# Patient Record
Sex: FEMALE | Race: BLACK OR AFRICAN AMERICAN | Employment: FULL TIME | URBAN - METROPOLITAN AREA
[De-identification: names, ages, dates, MRNs, and addresses within clinical notes are randomized per-mention and may not be internally consistent; named-entity substitution may affect disease eponyms.]

---

## 2019-03-06 ENCOUNTER — APPOINTMENT (OUTPATIENT)
Dept: CT IMAGING | Age: 39
End: 2019-03-06
Payer: MEDICAID

## 2019-03-06 ENCOUNTER — HOSPITAL ENCOUNTER (EMERGENCY)
Age: 39
Discharge: HOME | End: 2019-03-06
Payer: MEDICAID

## 2019-03-06 VITALS
HEIGHT: 61 IN | TEMPERATURE: 97.2 F | SYSTOLIC BLOOD PRESSURE: 141 MMHG | WEIGHT: 125 LBS | DIASTOLIC BLOOD PRESSURE: 86 MMHG | HEART RATE: 98 BPM | BODY MASS INDEX: 23.6 KG/M2 | OXYGEN SATURATION: 98 % | RESPIRATION RATE: 12 BRPM

## 2019-03-06 DIAGNOSIS — S00.03XA HEMATOMA OF SCALP, INITIAL ENCOUNTER: ICD-10-CM

## 2019-03-06 DIAGNOSIS — S09.90XA CLOSED HEAD INJURY, INITIAL ENCOUNTER: ICD-10-CM
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
## 2019-03-06 LAB
HCG, URINE, POC: NEGATIVE
Lab: NORMAL
NEGATIVE QC PASS/FAIL: NORMAL
POSITIVE QC PASS/FAIL: NORMAL

## 2019-03-06 PROCEDURE — 99283 EMERGENCY DEPT VISIT LOW MDM: CPT

## 2019-03-06 PROCEDURE — 70486 CT MAXILLOFACIAL W/O DYE: CPT

## 2019-03-06 PROCEDURE — 6370000000 HC RX 637 (ALT 250 FOR IP): Performed by: PHYSICIAN ASSISTANT

## 2019-03-06 PROCEDURE — 70450 CT HEAD/BRAIN W/O DYE: CPT

## 2019-03-06 PROCEDURE — 72125 CT NECK SPINE W/O DYE: CPT

## 2019-03-06 RX ORDER — ACETAMINOPHEN 500 MG
1000 TABLET ORAL ONCE
Status: COMPLETED | OUTPATIENT
Start: 2019-03-06 | End: 2019-03-06

## 2019-03-06 RX ORDER — NAPROXEN 500 MG/1
500 TABLET ORAL 2 TIMES DAILY
Qty: 14 TABLET | Refills: 0 | Status: SHIPPED | OUTPATIENT
Start: 2019-03-06 | End: 2021-07-02

## 2019-03-06 RX ADMIN — ACETAMINOPHEN 1000 MG: 500 TABLET ORAL at 17:08

## 2019-03-06 ASSESSMENT — PAIN SCALES - GENERAL: PAINLEVEL_OUTOF10: 9

## 2019-05-23 ENCOUNTER — HOSPITAL ENCOUNTER (EMERGENCY)
Age: 39
Discharge: HOME | End: 2019-05-23
Payer: MEDICAID

## 2019-05-23 ENCOUNTER — APPOINTMENT (OUTPATIENT)
Dept: CT IMAGING | Age: 39
End: 2019-05-23
Payer: MEDICAID

## 2019-05-23 VITALS
DIASTOLIC BLOOD PRESSURE: 77 MMHG | HEART RATE: 88 BPM | SYSTOLIC BLOOD PRESSURE: 120 MMHG | BODY MASS INDEX: 23.03 KG/M2 | TEMPERATURE: 97.7 F | RESPIRATION RATE: 14 BRPM | HEIGHT: 61 IN | OXYGEN SATURATION: 96 % | WEIGHT: 122 LBS
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
## 2019-05-23 DIAGNOSIS — S01.81XA FACIAL LACERATION, INITIAL ENCOUNTER: ICD-10-CM

## 2019-05-23 DIAGNOSIS — S09.90XA INJURY OF HEAD, INITIAL ENCOUNTER: ICD-10-CM

## 2019-05-23 DIAGNOSIS — S02.2XXA CLOSED FRACTURE OF NASAL BONE, INITIAL ENCOUNTER: ICD-10-CM

## 2019-05-23 PROCEDURE — 90471 IMMUNIZATION ADMIN: CPT | Performed by: NURSE PRACTITIONER

## 2019-05-23 PROCEDURE — 90715 TDAP VACCINE 7 YRS/> IM: CPT | Performed by: NURSE PRACTITIONER

## 2019-05-23 PROCEDURE — 6360000002 HC RX W HCPCS: Performed by: NURSE PRACTITIONER

## 2019-05-23 PROCEDURE — 99284 EMERGENCY DEPT VISIT MOD MDM: CPT

## 2019-05-23 PROCEDURE — 6370000000 HC RX 637 (ALT 250 FOR IP): Performed by: NURSE PRACTITIONER

## 2019-05-23 PROCEDURE — 70486 CT MAXILLOFACIAL W/O DYE: CPT

## 2019-05-23 PROCEDURE — 2500000003 HC RX 250 WO HCPCS: Performed by: NURSE PRACTITIONER

## 2019-05-23 PROCEDURE — 12013 RPR F/E/E/N/L/M 2.6-5.0 CM: CPT

## 2019-05-23 PROCEDURE — 70450 CT HEAD/BRAIN W/O DYE: CPT

## 2019-05-23 RX ORDER — CEPHALEXIN 500 MG/1
500 CAPSULE ORAL 4 TIMES DAILY
Qty: 40 CAPSULE | Refills: 0 | Status: SHIPPED | OUTPATIENT
Start: 2019-05-23 | End: 2021-07-02

## 2019-05-23 RX ORDER — OXYCODONE HYDROCHLORIDE AND ACETAMINOPHEN 5; 325 MG/1; MG/1
1 TABLET ORAL EVERY 6 HOURS PRN
Qty: 4 TABLET | Refills: 0 | Status: SHIPPED | OUTPATIENT
Start: 2019-05-23 | End: 2019-05-26

## 2019-05-23 RX ORDER — IBUPROFEN 800 MG/1
800 TABLET ORAL EVERY 8 HOURS PRN
Qty: 21 TABLET | Refills: 0 | Status: SHIPPED | OUTPATIENT
Start: 2019-05-23 | End: 2021-07-02

## 2019-05-23 RX ORDER — LIDOCAINE HYDROCHLORIDE 10 MG/ML
5 INJECTION, SOLUTION INFILTRATION; PERINEURAL ONCE
Status: COMPLETED | OUTPATIENT
Start: 2019-05-23 | End: 2019-05-23

## 2019-05-23 RX ORDER — OXYCODONE HYDROCHLORIDE AND ACETAMINOPHEN 5; 325 MG/1; MG/1
1 TABLET ORAL ONCE
Status: COMPLETED | OUTPATIENT
Start: 2019-05-23 | End: 2019-05-23

## 2019-05-23 RX ORDER — DIAPER,BRIEF,INFANT-TODD,DISP
EACH MISCELLANEOUS ONCE
Status: COMPLETED | OUTPATIENT
Start: 2019-05-23 | End: 2019-05-23

## 2019-05-23 RX ADMIN — TETANUS TOXOID, REDUCED DIPHTHERIA TOXOID AND ACELLULAR PERTUSSIS VACCINE, ADSORBED 0.5 ML: 5; 2.5; 8; 8; 2.5 SUSPENSION INTRAMUSCULAR at 19:59

## 2019-05-23 RX ADMIN — OXYCODONE HYDROCHLORIDE AND ACETAMINOPHEN 1 TABLET: 5; 325 TABLET ORAL at 19:59

## 2019-05-23 RX ADMIN — BACITRACIN ZINC: 500 OINTMENT TOPICAL at 20:04

## 2019-05-23 RX ADMIN — LIDOCAINE HYDROCHLORIDE 5 ML: 10 INJECTION, SOLUTION INFILTRATION; PERINEURAL at 20:04

## 2019-05-23 ASSESSMENT — PAIN DESCRIPTION - PAIN TYPE: TYPE: ACUTE PAIN

## 2019-05-23 ASSESSMENT — PAIN DESCRIPTION - ONSET: ONSET: ON-GOING

## 2019-05-23 ASSESSMENT — PAIN DESCRIPTION - ORIENTATION: ORIENTATION: RIGHT

## 2019-05-23 ASSESSMENT — PAIN DESCRIPTION - DESCRIPTORS: DESCRIPTORS: ACHING;THROBBING

## 2019-05-23 ASSESSMENT — PAIN SCALES - GENERAL
PAINLEVEL_OUTOF10: 10
PAINLEVEL_OUTOF10: 10

## 2019-05-23 ASSESSMENT — PAIN DESCRIPTION - FREQUENCY: FREQUENCY: CONTINUOUS

## 2019-05-23 ASSESSMENT — PAIN DESCRIPTION - LOCATION: LOCATION: FACE;HEAD

## 2019-05-23 ASSESSMENT — PAIN DESCRIPTION - PROGRESSION: CLINICAL_PROGRESSION: NOT CHANGED

## 2019-05-23 NOTE — ED NOTES
Pt is not in the room at this time. Unable to medicate pt.       Velma Draper, VENKAT  05/23/19 4161

## 2019-05-23 NOTE — ED NOTES
Radiology Procedure Waiver   Name: Lamonte Manley  : 1980  MRN: [id number]    Date:  19    Time: 7:25 PM    Benefits of immediately proceeding with Radiology exam(s) without pre-testing outweigh the risks or are not indicated as specified below and therefore the following is/are being waived:    [x] Pregnancy test   [x] Patients LMP on-time and regular.   [] Patient had Tubal Ligation or has other Contraception Device. [] Patient  is Menopausal or Premenarcheal.    [] Patient had Full or Partial Hysterectomy. [] Protocol for Iodine allergy    [] MRI Questionnaire     [] BUN/Creatinine   [] Patient age w/no hx of renal dysfunction. [] Patient on Dialysis. [] Recent Normal Labs.   Electronically signed by CANDIDO Costa CNP on 19 at 7:25 PM             CANDIDO Costa CNP  [id number]
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
## 2019-09-05 ENCOUNTER — HOSPITAL ENCOUNTER (OUTPATIENT)
Age: 39
Discharge: HOME | End: 2019-09-05
Payer: MEDICAID

## 2019-09-05 PROCEDURE — 86481 TB AG RESPONSE T-CELL SUSP: CPT

## 2019-09-05 PROCEDURE — 36415 COLL VENOUS BLD VENIPUNCTURE: CPT

## 2019-09-17 LAB
COMMENT: NORMAL
REPORT: NORMAL

## 2021-07-02 ENCOUNTER — HOSPITAL ENCOUNTER (EMERGENCY)
Age: 41
Discharge: HOME | End: 2021-07-03
Payer: MEDICAID

## 2021-07-02 ENCOUNTER — APPOINTMENT (OUTPATIENT)
Dept: ULTRASOUND IMAGING | Age: 41
End: 2021-07-02
Payer: MEDICAID

## 2021-07-02 DIAGNOSIS — N93.8 DYSFUNCTIONAL UTERINE BLEEDING: ICD-10-CM

## 2021-07-02 DIAGNOSIS — R11.0 NAUSEA: Primary | ICD-10-CM

## 2021-07-02 LAB
ABO/RH: NORMAL
ALBUMIN SERPL-MCNC: 4.8 G/DL (ref 3.5–5.2)
ALP BLD-CCNC: 81 U/L (ref 35–104)
ALT SERPL-CCNC: 7 U/L (ref 0–32)
ANION GAP SERPL CALCULATED.3IONS-SCNC: 10 MMOL/L (ref 7–16)
AST SERPL-CCNC: 13 U/L (ref 0–31)
BACTERIA: ABNORMAL /HPF
BASOPHILS ABSOLUTE: 0.02 E9/L (ref 0–0.2)
BASOPHILS RELATIVE PERCENT: 0.2 % (ref 0–2)
BILIRUB SERPL-MCNC: 1 MG/DL (ref 0–1.2)
BILIRUBIN URINE: NEGATIVE
BLOOD, URINE: ABNORMAL
BUN BLDV-MCNC: 7 MG/DL (ref 6–20)
CALCIUM SERPL-MCNC: 9.4 MG/DL (ref 8.6–10.2)
CHLORIDE BLD-SCNC: 101 MMOL/L (ref 98–107)
CLARITY: ABNORMAL
CO2: 30 MMOL/L (ref 22–29)
COLOR: ABNORMAL
CREAT SERPL-MCNC: 0.6 MG/DL (ref 0.5–1)
EOSINOPHILS ABSOLUTE: 0.07 E9/L (ref 0.05–0.5)
EOSINOPHILS RELATIVE PERCENT: 0.8 % (ref 0–6)
GFR AFRICAN AMERICAN: >60
GFR NON-AFRICAN AMERICAN: >60 ML/MIN/1.73
GLUCOSE BLD-MCNC: 112 MG/DL (ref 74–99)
GLUCOSE URINE: NEGATIVE MG/DL
GONADOTROPIN, CHORIONIC (HCG) QUANT: 6.6 MIU/ML
HCG QUALITATIVE: NEGATIVE
HCG, URINE, POC: NEGATIVE
HCT VFR BLD CALC: 36.1 % (ref 34–48)
HEMOGLOBIN: 12 G/DL (ref 11.5–15.5)
IMMATURE GRANULOCYTES #: 0.03 E9/L
IMMATURE GRANULOCYTES %: 0.3 % (ref 0–5)
KETONES, URINE: ABNORMAL MG/DL
LEUKOCYTE ESTERASE, URINE: ABNORMAL
LYMPHOCYTES ABSOLUTE: 3.07 E9/L (ref 1.5–4)
LYMPHOCYTES RELATIVE PERCENT: 35 % (ref 20–42)
Lab: NORMAL
MAGNESIUM: 2 MG/DL (ref 1.6–2.6)
MCH RBC QN AUTO: 27.1 PG (ref 26–35)
MCHC RBC AUTO-ENTMCNC: 33.2 % (ref 32–34.5)
MCV RBC AUTO: 81.7 FL (ref 80–99.9)
MONOCYTES ABSOLUTE: 0.64 E9/L (ref 0.1–0.95)
MONOCYTES RELATIVE PERCENT: 7.3 % (ref 2–12)
NEGATIVE QC PASS/FAIL: NORMAL
NEUTROPHILS ABSOLUTE: 4.94 E9/L (ref 1.8–7.3)
NEUTROPHILS RELATIVE PERCENT: 56.4 % (ref 43–80)
NITRITE, URINE: POSITIVE
PDW BLD-RTO: 14.6 FL (ref 11.5–15)
PH UA: 8.5 (ref 5–9)
PLATELET # BLD: 278 E9/L (ref 130–450)
PMV BLD AUTO: 9.6 FL (ref 7–12)
POSITIVE QC PASS/FAIL: NORMAL
POTASSIUM REFLEX MAGNESIUM: 3.3 MMOL/L (ref 3.5–5)
PROTEIN UA: 100 MG/DL
RBC # BLD: 4.42 E12/L (ref 3.5–5.5)
RBC UA: ABNORMAL /HPF (ref 0–2)
SODIUM BLD-SCNC: 141 MMOL/L (ref 132–146)
SPECIFIC GRAVITY UA: 1.01 (ref 1–1.03)
TOTAL PROTEIN: 8.3 G/DL (ref 6.4–8.3)
UROBILINOGEN, URINE: 4 E.U./DL
WBC # BLD: 8.8 E9/L (ref 4.5–11.5)
WBC UA: ABNORMAL /HPF (ref 0–5)

## 2021-07-02 PROCEDURE — 87591 N.GONORRHOEAE DNA AMP PROB: CPT

## 2021-07-02 PROCEDURE — 81001 URINALYSIS AUTO W/SCOPE: CPT

## 2021-07-02 PROCEDURE — 6370000000 HC RX 637 (ALT 250 FOR IP): Performed by: PHYSICIAN ASSISTANT

## 2021-07-02 PROCEDURE — 84703 CHORIONIC GONADOTROPIN ASSAY: CPT

## 2021-07-02 PROCEDURE — 84702 CHORIONIC GONADOTROPIN TEST: CPT

## 2021-07-02 PROCEDURE — 99283 EMERGENCY DEPT VISIT LOW MDM: CPT

## 2021-07-02 PROCEDURE — 86900 BLOOD TYPING SEROLOGIC ABO: CPT

## 2021-07-02 PROCEDURE — 87491 CHLMYD TRACH DNA AMP PROBE: CPT

## 2021-07-02 PROCEDURE — 80053 COMPREHEN METABOLIC PANEL: CPT

## 2021-07-02 PROCEDURE — 85025 COMPLETE CBC W/AUTO DIFF WBC: CPT

## 2021-07-02 PROCEDURE — 83735 ASSAY OF MAGNESIUM: CPT

## 2021-07-02 PROCEDURE — 86901 BLOOD TYPING SEROLOGIC RH(D): CPT

## 2021-07-02 PROCEDURE — 76801 OB US < 14 WKS SINGLE FETUS: CPT

## 2021-07-02 PROCEDURE — 36415 COLL VENOUS BLD VENIPUNCTURE: CPT

## 2021-07-02 PROCEDURE — 87210 SMEAR WET MOUNT SALINE/INK: CPT

## 2021-07-02 RX ORDER — POTASSIUM CHLORIDE 20 MEQ/1
40 TABLET, EXTENDED RELEASE ORAL ONCE
Status: COMPLETED | OUTPATIENT
Start: 2021-07-02 | End: 2021-07-02

## 2021-07-02 RX ORDER — ONDANSETRON 2 MG/ML
4 INJECTION INTRAMUSCULAR; INTRAVENOUS ONCE
Status: DISCONTINUED | OUTPATIENT
Start: 2021-07-02 | End: 2021-07-02

## 2021-07-02 RX ORDER — ONDANSETRON 4 MG/1
4 TABLET, ORALLY DISINTEGRATING ORAL EVERY 8 HOURS PRN
Qty: 10 TABLET | Refills: 0 | Status: SHIPPED | OUTPATIENT
Start: 2021-07-02 | End: 2021-07-02

## 2021-07-02 RX ADMIN — POTASSIUM CHLORIDE 40 MEQ: 20 TABLET, EXTENDED RELEASE ORAL at 21:59

## 2021-07-02 NOTE — ED NOTES
Bed: 01  Expected date:   Expected time:   Means of arrival:   Comments:  triage     Yuliya Feng, VENKAT  07/02/21 0665

## 2021-07-02 NOTE — ED NOTES
FIRST PROVIDER CONTACT ASSESSMENT NOTE        Department of Emergency Medicine            ED  First Provider Note            7/2/21  7:51 PM EDT    Chief Complaint: No chief complaint on file. History of Present Illness:    Kenyatta Vivas is a 42 y.o. female who presents to the emergency department for  nausea, no appetite positive pregnancy    Focused Screening Exam:  Constitutional:  Alert, appears stated age and is in no distress. Heart rrr   Lungs clear     *ALLERGIES*     Patient has no known allergies.      ED Triage Vitals [07/02/21 1950]   BP Temp Temp src Pulse Resp SpO2 Height Weight   -- 97.1 °F (36.2 °C) -- 88 -- 96 % -- --        Initial Plan of Care:  Initiate Treatment-Testing, Proceed toTreatment Area When Bed Available for ED Attending/MLP to Continue Care    -----------------[address] ASSESSMENT NOTE--------------  Electronically signed by APOLINAR Baires   DD: 7/2/21     APOLINAR Baires  07/02/21 1954

## 2021-07-02 NOTE — LETTER
Jack Sheffield was seen and treated in our emergency department on 7/2/2021. She may return to work on 7/5/2021. If you have any questions or concerns, please don't hesitate to call.     Jaylon Uribe RN

## 2021-07-03 VITALS
HEART RATE: 72 BPM | HEIGHT: 61 IN | DIASTOLIC BLOOD PRESSURE: 74 MMHG | WEIGHT: 130 LBS | TEMPERATURE: 97.1 F | RESPIRATION RATE: 12 BRPM | OXYGEN SATURATION: 100 % | BODY MASS INDEX: 24.55 KG/M2 | SYSTOLIC BLOOD PRESSURE: 125 MMHG

## 2021-07-03 LAB
CLUE CELLS: NORMAL
SOURCE WET PREP: NORMAL
TRICHOMONAS PREP: NORMAL
YEAST WET PREP: NORMAL

## 2021-07-03 NOTE — ED NOTES
HCG poct negative.       Israel Barakat RN  07/02/21 7942      After many minutes this rn walked into pt's room and pregnancy test is now positive     Israel Barakat RN  07/02/21 2770

## 2021-07-03 NOTE — ED PROVIDER NOTES
[facility]  HPI:  21, Time: 8:03 PM EDT         Ruba Sellers is a 42 y.o. female presenting to the ED for nausea, lack of appetite beginning 1 week  ago. The complaint has been persistent, moderate in severity, and worsened by nothing. Patient comes in with complaint of nausea no appetite over the last week. She states she generally started. Around the  did not start her menses, and took 6 urine pregnancies which were positive. She states this morning she woke up spotting. She denies any abdominal pain. Review of Systems:   A complete review of systems was performed and pertinent positives and negatives are stated within HPI, all other systems reviewed and are negative.          --------------------------------------------- PAST HISTORY ---------------------------------------------  Past Medical History:  has a past medical history of Abnormal Pap smear, Diabetes mellitus ([facility] [address] and Sickle cell trait ([facility] Past Surgical History:  has a past surgical history that includes laparoscopy and  section. Social History:  reports that she has never smoked. She has never used smokeless tobacco. She reports that she does not drink alcohol and does not use drugs. Family History: family history is not on file. The patients home medications have been reviewed. Allergies: Patient has no known allergies.     -------------------------------------------------- RESULTS -------------------------------------------------  All laboratory and radiology results have been personally reviewed by myself   LABS:  Results for orders placed or performed during the hospital encounter of 21   C.trachomatis N.gonorrhoeae DNA    Specimen: Cervix   Result Value Ref Range    Source vagina    Wet prep, genital   Result Value Ref Range    Trichomonas Prep None Seen     Yeast, Wet Prep None Seen     Clue Cells, Wet Prep None Seen     Source Wet Prep VAGINAL    CBC Auto Differential   Result Value Ref Range    WBC 8.8 4.5 - 11.5 E9/L    RBC 4.42 3.50 - 5.50 E12/L    Hemoglobin 12.0 11.5 - 15.5 g/dL    Hematocrit 36.1 34.0 - 48.0 %    MCV 81.7 80.0 - 99.9 fL    MCH 27.1 26.0 - 35.0 pg    MCHC 33.2 32.0 - 34.5 %    RDW 14.6 11.5 - 15.0 fL    Platelets 295 426 - 556 E9/L    MPV 9.6 7.0 - 12.0 fL    Neutrophils % 56.4 43.0 - 80.0 %    Immature Granulocytes % 0.3 0.0 - 5.0 %    Lymphocytes % 35.0 20.0 - 42.0 %    Monocytes % 7.3 2.0 - 12.0 %    Eosinophils % 0.8 0.0 - 6.0 %    Basophils % 0.2 0.0 - 2.0 %    Neutrophils Absolute 4.94 1.80 - 7.30 E9/L    Immature Granulocytes # 0.03 E9/L    Lymphocytes Absolute 3.07 1.50 - 4.00 E9/L    Monocytes Absolute 0.64 0.10 - 0.95 E9/L    Eosinophils Absolute 0.07 0.05 - 0.50 E9/L    Basophils Absolute 0.02 0.00 - 0.20 E9/L   Comprehensive Metabolic Panel w/ Reflex to MG   Result Value Ref Range    Sodium 141 132 - 146 mmol/L    Potassium reflex Magnesium 3.3 (L) 3.5 - 5.0 mmol/L    Chloride 101 98 - 107 mmol/L    CO2 30 (H) 22 - 29 mmol/L    Anion Gap 10 7 - 16 mmol/L    Glucose 112 (H) 74 - 99 mg/dL    BUN 7 6 - 20 mg/dL    CREATININE 0.6 0.5 - 1.0 mg/dL    GFR Non-African American >60 >=60 mL/min/1.73    GFR African American >60     Calcium 9.4 8.6 - 10.2 mg/dL    Total Protein 8.3 6.4 - 8.3 g/dL    Albumin 4.8 3.5 - 5.2 g/dL    Total Bilirubin 1.0 0.0 - 1.2 mg/dL    Alkaline Phosphatase 81 35 - 104 U/L    ALT 7 0 - 32 U/L    AST 13 0 - 31 U/L   HCG Qualitative, Serum   Result Value Ref Range    hCG Qual NEGATIVE NEGATIVE   HCG, Quantitative, Pregnancy   Result Value Ref Range    hCG Quant 6.6 <10 mIU/mL   Urinalysis   Result Value Ref Range    Color, UA RED (A) Straw/Yellow    Clarity, UA TURBID (A) Clear    Glucose, Ur Negative Negative mg/dL    Bilirubin Urine Negative Negative    Ketones, Urine TRACE (A) Negative mg/dL    Specific Gravity, UA 1.015 1.005 - 1.030    Blood, Urine LARGE (A) Negative    pH, UA 8.5 5.0 - 9.0    Protein,  (A) Negative mg/dL Urobilinogen, Urine 4.0 (A) <2.0 E.U./dL    Nitrite, Urine POSITIVE (A) Negative    Leukocyte Esterase, Urine SMALL (A) Negative   Magnesium   Result Value Ref Range    Magnesium 2.0 1.6 - 2.6 mg/dL   Microscopic Urinalysis   Result Value Ref Range    WBC, UA 1-3 0 - 5 /HPF    RBC, UA PACKED 0 - 2 /HPF    Bacteria, UA MODERATE (A) None Seen /HPF   POC Pregnancy Urine Qual   Result Value Ref Range    HCG, Urine, POC Negative Negative    Lot Number [id number]     Positive QC Pass/Fail Acceptable     Negative QC Pass/Fail Acceptable    ABO/RH   Result Value Ref Range    ABO/Rh O POS        RADIOLOGY:  Interpreted by Radiologist.  US OB LESS THAN 14 WEEKS SINGLE OR FIRST GESTATION   Final Result   No evidence of an intrauterine pregnancy. No fluid or debris in the   endometrial canal.      Sonographically normal right and left ovary and right left adnexa. No free fluid. Correlate with beta HCG levels. ------------------------- NURSING NOTES AND VITALS REVIEWED ---------------------------   The nursing notes within the ED encounter and vital signs as below have been reviewed. /74   Pulse 72   Temp 97.1 °F (36.2 °C)   Resp 12   Ht 5' 1\" (1.549 m)   Wt 130 lb (59 kg)   SpO2 100%   BMI 24.56 kg/m²   Oxygen Saturation Interpretation: Normal      ---------------------------------------------------PHYSICAL EXAM--------------------------------------      Constitutional/General: Alert and oriented x3, well appearing, non toxic in NAD  Head: Normocephalic and atraumatic  Eyes: PERRL, EOMI  Mouth: Oropharynx clear, handling secretions, no trismus  Neck: Supple, full ROM,   Pulmonary: Lungs clear to auscultation bilaterally, no wheezes, rales, or rhonchi. Not in respiratory distress  Cardiovascular:  Regular rate and rhythm, no murmurs, gallops, or rubs. 2+ distal pulses  Abdomen: Soft, non tender, non distended  pelvic normal external genitalia.   Small to moderate amount of blood within the vaginal vault no cervical motion tenderness no adnexal tenderness no uterine tenderness on exam  Extremities: Moves all extremities x 4. Warm and well perfused  Skin: warm and dry without rash  Neurologic: GCS 15,  Psych: Normal Affect      ------------------------------ ED COURSE/MEDICAL DECISION MAKING----------------------  Medications   potassium chloride (KLOR-CON M) extended release tablet 40 mEq (40 mEq Oral Given 7/2/21 2159)         ED COURSE:   2115 patient updated. Medical Decision Making:    Patient came in with complaint of multiple positive pregnancy test at home. Urine hCG serum hCG were negative and hCG quant was 6 less than 10 being negative. Ultrasound showing no evidence of intrauterine pregnancy no fluid or debris in the endometrial canal no free fluid. Patient likely on her menses at this time. She does have an appointment with her OB/GYN she is to call on Monday for follow-up. Patient with normal hemoglobin hemodynamically stable. Counseling: The emergency provider has spoken with the patient and discussed todays results, in addition to providing specific details for the plan of care and counseling regarding the diagnosis and prognosis. Questions are answered at this time and they are agreeable with the plan.      --------------------------------- IMPRESSION AND DISPOSITION ---------------------------------    IMPRESSION  1. Nausea    2. Dysfunctional uterine bleeding        DISPOSITION  Disposition: Discharge to home  Patient condition is good      NOTE: This report was transcribed using voice recognition software.  Every effort was made to ensure accuracy; however, inadvertent computerized transcription errors may be present     APOLINAR Reina  07/03/21 1222 APOLINAR Harper  07/03/21 0878

## 2021-07-08 LAB
C TRACH DNA GENITAL QL NAA+PROBE: NEGATIVE
N. GONORRHOEAE DNA: NEGATIVE
SOURCE: NORMAL

## 2021-08-11 ENCOUNTER — OFFICE VISIT (OUTPATIENT)
Dept: OBGYN | Age: 41
End: 2021-08-11
Payer: MEDICAID

## 2021-08-11 VITALS
SYSTOLIC BLOOD PRESSURE: 126 MMHG | HEIGHT: 61 IN | DIASTOLIC BLOOD PRESSURE: 72 MMHG | HEART RATE: 88 BPM | WEIGHT: 109 LBS | RESPIRATION RATE: 16 BRPM | BODY MASS INDEX: 20.58 KG/M2

## 2021-08-11 DIAGNOSIS — Z01.419 WELL WOMAN EXAM WITH ROUTINE GYNECOLOGICAL EXAM: Primary | ICD-10-CM

## 2021-08-11 DIAGNOSIS — Z12.31 ENCOUNTER FOR SCREENING MAMMOGRAM FOR BREAST CANCER: ICD-10-CM

## 2021-08-11 PROCEDURE — 99203 OFFICE O/P NEW LOW 30 MIN: CPT | Performed by: OBSTETRICS & GYNECOLOGY

## 2021-08-11 PROCEDURE — 99386 PREV VISIT NEW AGE 40-64: CPT | Performed by: OBSTETRICS & GYNECOLOGY

## 2021-08-11 NOTE — PROGRESS NOTES
Chief complaint: 42 year- old presents for well woman exam. This is a new patient to me and to the [facility]. History:    G4, P4,Ab0., 2 vaginal, 2   Doing well. Periods:  Regular, normal.  Sexually active: yes . No abdominal or pelvic pain. No dyspareunia. No abnormal vaginal  discharge. Previous Pap smears normal. Last Pap smear last yeat at [facility] [place]. No urinary or GI symptoms. Medical/ surgical history and medications reviewed. Mammogram discussed and ordered. On metformin for pre diabetes. [place] [facility]    ROS: Negative    Examination:    Vital signs reviewed. GA : Healthy. Oriented x 3 no distress. HEENT : hearing grossly intact, no jaundice, no oral lesions or nasal discharge. Neck : Supple. Thyroid : normal, no goiter. Breasts : no masses. No skin changes or lymphadenopathy. No nipple discharge. Abdomen :Soft. No masses. No organomegaly. V&V : Normal female external genitalia. BUS: Normal.  PS : Adequate. Cervix : No lesions, pap smear not due, will send for old paps. Uterus : Normal size. Adnexa : Free, no masses.     IMP: Normal    Plan: RTC 1-2 years, Mammogram, send for pap reports

## 2022-08-30 ENCOUNTER — TELEPHONE (OUTPATIENT)
Dept: OBGYN | Age: 42
End: 2022-08-30

## 2022-08-31 DIAGNOSIS — Z12.31 ENCOUNTER FOR SCREENING MAMMOGRAM FOR BREAST CANCER: Primary | ICD-10-CM

## 2022-10-20 ENCOUNTER — OFFICE VISIT (OUTPATIENT)
Dept: FAMILY MEDICINE CLINIC | Age: 42
End: 2022-10-20
Payer: MEDICAID

## 2022-10-20 VITALS
WEIGHT: 113.6 LBS | BODY MASS INDEX: 21.45 KG/M2 | OXYGEN SATURATION: 100 % | HEART RATE: 98 BPM | RESPIRATION RATE: 12 BRPM | TEMPERATURE: 97.5 F | DIASTOLIC BLOOD PRESSURE: 70 MMHG | HEIGHT: 61 IN | SYSTOLIC BLOOD PRESSURE: 124 MMHG

## 2022-10-20 DIAGNOSIS — E11.9 TYPE 2 DIABETES MELLITUS WITHOUT COMPLICATION, WITHOUT LONG-TERM CURRENT USE OF INSULIN (HCC): Primary | ICD-10-CM

## 2022-10-20 DIAGNOSIS — R92.8 ABNORMAL MAMMOGRAM OF LEFT BREAST: ICD-10-CM

## 2022-10-20 PROCEDURE — 1036F TOBACCO NON-USER: CPT | Performed by: FAMILY MEDICINE

## 2022-10-20 PROCEDURE — 99203 OFFICE O/P NEW LOW 30 MIN: CPT | Performed by: FAMILY MEDICINE

## 2022-10-20 PROCEDURE — 3046F HEMOGLOBIN A1C LEVEL >9.0%: CPT | Performed by: FAMILY MEDICINE

## 2022-10-20 PROCEDURE — G8427 DOCREV CUR MEDS BY ELIG CLIN: HCPCS | Performed by: FAMILY MEDICINE

## 2022-10-20 PROCEDURE — G8484 FLU IMMUNIZE NO ADMIN: HCPCS | Performed by: FAMILY MEDICINE

## 2022-10-20 PROCEDURE — G8420 CALC BMI NORM PARAMETERS: HCPCS | Performed by: FAMILY MEDICINE

## 2022-10-20 PROCEDURE — 2022F DILAT RTA XM EVC RTNOPTHY: CPT | Performed by: FAMILY MEDICINE

## 2022-10-20 SDOH — ECONOMIC STABILITY: FOOD INSECURITY: WITHIN THE PAST 12 MONTHS, YOU WORRIED THAT YOUR FOOD WOULD RUN OUT BEFORE YOU GOT MONEY TO BUY MORE.: NEVER TRUE

## 2022-10-20 SDOH — ECONOMIC STABILITY: FOOD INSECURITY: WITHIN THE PAST 12 MONTHS, THE FOOD YOU BOUGHT JUST DIDN'T LAST AND YOU DIDN'T HAVE MONEY TO GET MORE.: NEVER TRUE

## 2022-10-20 ASSESSMENT — PATIENT HEALTH QUESTIONNAIRE - PHQ9
SUM OF ALL RESPONSES TO PHQ QUESTIONS 1-9: 24
2. FEELING DOWN, DEPRESSED OR HOPELESS: 3
SUM OF ALL RESPONSES TO PHQ QUESTIONS 1-9: 24
9. THOUGHTS THAT YOU WOULD BE BETTER OFF DEAD, OR OF HURTING YOURSELF: 0
6. FEELING BAD ABOUT YOURSELF - OR THAT YOU ARE A FAILURE OR HAVE LET YOURSELF OR YOUR FAMILY DOWN: 3
5. POOR APPETITE OR OVEREATING: 3
SUM OF ALL RESPONSES TO PHQ9 QUESTIONS 1 & 2: 6
SUM OF ALL RESPONSES TO PHQ QUESTIONS 1-9: 24
10. IF YOU CHECKED OFF ANY PROBLEMS, HOW DIFFICULT HAVE THESE PROBLEMS MADE IT FOR YOU TO DO YOUR WORK, TAKE CARE OF THINGS AT HOME, OR GET ALONG WITH OTHER PEOPLE: 3
4. FEELING TIRED OR HAVING LITTLE ENERGY: 3
7. TROUBLE CONCENTRATING ON THINGS, SUCH AS READING THE NEWSPAPER OR WATCHING TELEVISION: 3
1. LITTLE INTEREST OR PLEASURE IN DOING THINGS: 3
3. TROUBLE FALLING OR STAYING ASLEEP: 3
SUM OF ALL RESPONSES TO PHQ QUESTIONS 1-9: 24
8. MOVING OR SPEAKING SO SLOWLY THAT OTHER PEOPLE COULD HAVE NOTICED. OR THE OPPOSITE, BEING SO FIGETY OR RESTLESS THAT YOU HAVE BEEN MOVING AROUND A LOT MORE THAN USUAL: 3

## 2022-10-20 ASSESSMENT — COLUMBIA-SUICIDE SEVERITY RATING SCALE - C-SSRS
6. HAVE YOU EVER DONE ANYTHING, STARTED TO DO ANYTHING, OR PREPARED TO DO ANYTHING TO END YOUR LIFE?: NO
2. HAVE YOU ACTUALLY HAD ANY THOUGHTS OF KILLING YOURSELF?: NO
1. WITHIN THE PAST MONTH, HAVE YOU WISHED YOU WERE DEAD OR WISHED YOU COULD GO TO SLEEP AND NOT WAKE UP?: NO

## 2022-10-20 ASSESSMENT — ENCOUNTER SYMPTOMS
ABDOMINAL PAIN: 0
SHORTNESS OF BREATH: 0
DIARRHEA: 0
WHEEZING: 0
NAUSEA: 0
VOMITING: 0
CONSTIPATION: 0
COUGH: 0

## 2022-10-20 ASSESSMENT — SOCIAL DETERMINANTS OF HEALTH (SDOH): HOW HARD IS IT FOR YOU TO PAY FOR THE VERY BASICS LIKE FOOD, HOUSING, MEDICAL CARE, AND HEATING?: SOMEWHAT HARD

## 2022-10-20 NOTE — PROGRESS NOTES
[facility]  DATE OF VISIT : 10/20/2022    Patient : Valencia Metz   Age : 36 y.o.  : 1980   MRN : [id number]   ______________________________________________________________________    Chief Complaint :   Chief Complaint   Patient presents with    New Patient     Patient is here today to become established as a new patient. States she needs a breast biopsy of her left breast since she was told her breast was dense breasts, had breast augmentation surgery to be put on hold. C/o not being able to gain weight due to a mental breakdown due to the loss of her sister and two close friends. Went to Counseling and they said she was bipolar but does not want to be on meds. HPI : Valencia Metz is 36 y.o. female who presented to the clinic today for patient encounter. Patient spoke to a surgeon in August to get breast augmentation in [place], she was told she needed blood work and mammography. Patient had mammogram done in [facility] on 22: Received a letter stating that her mammogram showed the need for a left stereotactic core biopsy but has no explanation why. Patient denies any changes on the skin of her breast, no nipple inversion, bleeding or discharge. Patient has not noticed any lumps on her breast or axilla. Diabetes: Last A1C  6.8. Was on metformin 1000mg BID and Glipizide 2mg daily. I reviewed the patient's past medications, allergies and past medical history during this visit.     Past Medical History :  Health Maintenance-  Ob/Gyn:  [id number]  LMP/Menopause- 10/8, regular every 28days  Last PAP smear- - Negative PAP and HPV  Ob Hx-  , 2 C-sections  Mammogram- 22- abnormal      Past Medical History:   Diagnosis Date    Abnormal Pap smear     repeat was normal    Diabetes mellitus ([facility] [address] 2014    Sickle cell trait ([facility] [address]      Past Surgical History:   Procedure Laterality Date     SECTION LAPAROSCOPY         Social History :  Social History       Tobacco History       Smoking Status  Never      Smokeless Tobacco Use  Never              Alcohol History       Alcohol Use Status  No Comment  not during pregnancy              Drug Use       Drug Use Status  No              Sexual Activity       Sexually Active  Not Asked                     Allergies :   No Known Allergies    Medication List :    Current Outpatient Medications   Medication Sig Dispense Refill    blood glucose monitor kit and supplies Dispense sufficient amount for indicated testing frequency plus additional to accommodate PRN testing needs. Dispense all needed supplies to include: monitor, strips, lancing device, lancets, control solutions, alcohol swabs. 1 kit 0    metFORMIN (GLUCOPHAGE) 1000 MG tablet Take 1 tablet by mouth 2 times daily (with meals) 60 tablet 3    glimepiride (AMARYL) 2 MG tablet Take 1 tablet by mouth Daily with supper. (Patient not taking: No sig reported) 30 tablet 3    lanolin ointment Apply to nipples as needed (Patient not taking: No sig reported) 1 Tube 3     No current facility-administered medications for this visit. Review of Systems :  Review of Systems   Constitutional:  Negative for chills, fatigue and fever. Respiratory:  Negative for cough, shortness of breath and wheezing. Cardiovascular:  Negative for chest pain, palpitations and leg swelling. Gastrointestinal:  Negative for abdominal pain, constipation, diarrhea, nausea and vomiting. Endocrine: Negative for polydipsia and polyuria.    Neurological:  Negative for dizziness, weakness, light-headedness, numbness and headaches.   ______________________________________________________________________    Physical Exam :    Vitals: /70 (Site: Left Upper Arm, Position: Sitting, Cuff Size: Large Adult)   Pulse 98   Temp 97.5 °F (36.4 °C) (Temporal)   Resp 12   Ht 5' 1\" (1.549 m)   Wt 113 lb 9.6 oz (51.5 kg)   LMP 10/03/2022 (Approximate)   SpO2 100%   BMI 21.46 kg/m²   Physical Exam  Vitals reviewed. Constitutional:       General: She is not in acute distress. Appearance: Normal appearance. She is not ill-appearing. Cardiovascular:      Rate and Rhythm: Normal rate and regular rhythm. Pulses: Normal pulses. Heart sounds: Normal heart sounds. No murmur heard. Pulmonary:      Effort: Pulmonary effort is normal. No respiratory distress. Breath sounds: Normal breath sounds. No wheezing. Abdominal:      General: Bowel sounds are normal. There is no distension. Palpations: Abdomen is soft. Tenderness: There is no abdominal tenderness. Musculoskeletal:      Right lower leg: No edema. Left lower leg: No edema. Neurological:      Mental Status: She is alert.   ___________________    Assessment & Plan :    1. Type 2 diabetes mellitus without complication, without long-term current use of insulin ([facility])  -Last A1c 8/11 was 6.8  -Continue metformin and glimepiride  -We will repeat A1c  - blood glucose monitor kit and supplies; Dispense sufficient amount for indicated testing frequency plus additional to accommodate PRN testing needs. Dispense all needed supplies to include: monitor, strips, lancing device, lancets, control solutions, alcohol swabs. Dispense: 1 kit; Refill: 0  - metFORMIN (GLUCOPHAGE) 1000 MG tablet; Take 1 tablet by mouth 2 times daily (with meals)  Dispense: 60 tablet; Refill: 3    2. Abnormal mammogram of left breast  -Discussed with Jhony Gasca CNP at the [facility] breast center.  -Referral placed  -Patient will go today to drop off her disc with the mammogram imaging  -Breast center will review imaging and determine whether patient needs a repeat mammogram or they can go ahead and schedule for biopsy.   - Samantha Barnes MD, Breast Surgery, [facility])    Educational materials and/or home exercises printed for patient's review and were included in patient instructions on his/her After Visit Summary and given to patient at the end of visit. Counseled regarding above diagnosis, including possible risks and complications,  especially if left uncontrolled. Counseled regarding the possible side effects, risks, benefits and alternatives to treatment; patient and/or guardian verbalizes understanding, agrees, feels comfortable with and wishes to proceed with above treatment plan. Advised patient to call with any new medication issues, and read all Rx info from pharmacy to assure aware of all possible risks and side effects of medication before taking. Reviewed age and gender appropriate health screening exams and vaccinations. Advised patient regarding importance of keeping up with recommended health maintenance and to schedule as soon as possible if overdue, as this is important in assessing for undiagnosed pathology, especially cancer, as well as protecting against potentially harmful/life threatening disease. Patient and/or guardian verbalizes understanding and agrees with above counseling, assessment and plan. All questions answered    Additional plan and future considerations:   RTO in 2 months for diabetic follow-up. Return to Office: Return in about 2 months (around 12/20/2022) for Diabetes.     Electronically signed by Jamie Brown MD on 10/20/2022 at 3:51 PM

## 2022-10-21 ENCOUNTER — TELEPHONE (OUTPATIENT)
Dept: BREAST CENTER | Age: 42
End: 2022-10-21

## 2022-10-21 DIAGNOSIS — R92.0 BREAST MICROCALCIFICATIONS: ICD-10-CM

## 2022-10-21 DIAGNOSIS — N64.59 ABNORMAL BREAST FINDING: Primary | ICD-10-CM

## 2022-10-21 NOTE — TELEPHONE ENCOUNTER
Imaging report received from [facility]. Spoke with patient in reference to her referral to the breast clinic. She agrees to proceed with the recommended left breast stereotactic biopsy. She does not take any blood thinners. She has not had any prior imaging. She is aware to bring a disc with her. We will take order to the schedulers to call the patient. We will then call the patient after results are received to schedule accordingly.

## 2022-10-21 NOTE — TELEPHONE ENCOUNTER
Left detailed message with [facility] radiology requesting patient's most recent mammogram(s), ultrasound(s), diagnostic tests relating to her breast.  I also requested the last 3 years of mammogram reports as well. Fax and phone number provided. Per the referral, patient has been recommended a biopsy so once we get the reports we will review, call the patient, and schedule accordingly.

## 2022-11-09 ENCOUNTER — HOSPITAL ENCOUNTER (OUTPATIENT)
Dept: GENERAL RADIOLOGY | Age: 42
End: 2022-11-09
Payer: MEDICAID

## 2022-11-09 ENCOUNTER — HOSPITAL ENCOUNTER (OUTPATIENT)
Dept: GENERAL RADIOLOGY | Age: 42
Discharge: HOME | End: 2022-11-11
Payer: MEDICAID

## 2022-11-09 VITALS — BODY MASS INDEX: 21.46 KG/M2 | HEIGHT: 61 IN

## 2022-11-09 DIAGNOSIS — N64.59 ABNORMAL BREAST FINDING: ICD-10-CM

## 2022-11-09 DIAGNOSIS — R92.0 BREAST MICROCALCIFICATIONS: ICD-10-CM

## 2022-11-09 PROCEDURE — 88360 TUMOR IMMUNOHISTOCHEM/MANUAL: CPT

## 2022-11-09 PROCEDURE — 88342 IMHCHEM/IMCYTCHM 1ST ANTB: CPT

## 2022-11-09 PROCEDURE — 88305 TISSUE EXAM BY PATHOLOGIST: CPT

## 2022-11-09 PROCEDURE — 88341 IMHCHEM/IMCYTCHM EA ADD ANTB: CPT

## 2022-11-09 PROCEDURE — A4648 IMPLANTABLE TISSUE MARKER: HCPCS

## 2022-11-09 PROCEDURE — 2500000003 HC RX 250 WO HCPCS

## 2022-11-09 NOTE — PROGRESS NOTES
Met with patient prior to her breast biopsy. Instructed on stereotactic  breast biopsy procedure. Denies use of blood thinners or aspirin products within the past 5 days. Instructed that results will be available in approximately 3-5 business days. She  is agreeable to discussion of breast biopsy results by phone. Instructed that her physician will also be notified of results. Provided with folder containing my contact information and post biopsy discharge instructions. Instructed to call me if she has any questions or concerns about her biopsy. Verbalizes understanding.   Electronically signed by Yu Blank RN, BSN on 11/9/2022 at 9:11 AM

## 2022-11-18 DIAGNOSIS — C50.912 MALIGNANT NEOPLASM OF LEFT FEMALE BREAST, UNSPECIFIED ESTROGEN RECEPTOR STATUS, UNSPECIFIED SITE OF BREAST (HCC): Primary | ICD-10-CM

## 2022-11-23 ENCOUNTER — TELEPHONE (OUTPATIENT)
Dept: CASE MANAGEMENT | Age: 42
End: 2022-11-23

## 2022-11-23 ENCOUNTER — OFFICE VISIT (OUTPATIENT)
Dept: BREAST CENTER | Age: 42
End: 2022-11-23
Payer: MEDICAID

## 2022-11-23 ENCOUNTER — HOSPITAL ENCOUNTER (OUTPATIENT)
Dept: GENERAL RADIOLOGY | Age: 42
Discharge: HOME | End: 2022-11-25
Payer: MEDICAID

## 2022-11-23 VITALS
RESPIRATION RATE: 16 BRPM | WEIGHT: 112 LBS | DIASTOLIC BLOOD PRESSURE: 60 MMHG | BODY MASS INDEX: 21.14 KG/M2 | HEIGHT: 61 IN | HEART RATE: 90 BPM | TEMPERATURE: 98.6 F | OXYGEN SATURATION: 100 % | SYSTOLIC BLOOD PRESSURE: 106 MMHG

## 2022-11-23 DIAGNOSIS — C50.912 MALIGNANT NEOPLASM OF LEFT FEMALE BREAST, UNSPECIFIED ESTROGEN RECEPTOR STATUS, UNSPECIFIED SITE OF BREAST (HCC): Primary | ICD-10-CM

## 2022-11-23 DIAGNOSIS — C50.912 MALIGNANT NEOPLASM OF LEFT FEMALE BREAST, UNSPECIFIED ESTROGEN RECEPTOR STATUS, UNSPECIFIED SITE OF BREAST (HCC): ICD-10-CM

## 2022-11-23 LAB
ALBUMIN SERPL-MCNC: 4.6 G/DL (ref 3.5–5.2)
ALP BLD-CCNC: 65 U/L (ref 35–104)
ALT SERPL-CCNC: 6 U/L (ref 0–32)
ANION GAP SERPL CALCULATED.3IONS-SCNC: 13 MMOL/L (ref 7–16)
AST SERPL-CCNC: 11 U/L (ref 0–31)
BASOPHILS ABSOLUTE: 0.02 E9/L (ref 0–0.2)
BASOPHILS RELATIVE PERCENT: 0.2 % (ref 0–2)
BILIRUB SERPL-MCNC: 1.3 MG/DL (ref 0–1.2)
BUN BLDV-MCNC: 8 MG/DL (ref 6–20)
CALCIUM SERPL-MCNC: 9.6 MG/DL (ref 8.6–10.2)
CHLORIDE BLD-SCNC: 104 MMOL/L (ref 98–107)
CO2: 24 MMOL/L (ref 22–29)
CREAT SERPL-MCNC: 0.6 MG/DL (ref 0.5–1)
EOSINOPHILS ABSOLUTE: 0.03 E9/L (ref 0.05–0.5)
EOSINOPHILS RELATIVE PERCENT: 0.3 % (ref 0–6)
GFR SERPL CREATININE-BSD FRML MDRD: >60 ML/MIN/1.73
GLUCOSE BLD-MCNC: 102 MG/DL (ref 74–99)
HCT VFR BLD CALC: 32 % (ref 34–48)
HEMOGLOBIN: 10.6 G/DL (ref 11.5–15.5)
IMMATURE GRANULOCYTES #: 0.03 E9/L
IMMATURE GRANULOCYTES %: 0.3 % (ref 0–5)
LYMPHOCYTES ABSOLUTE: 2.81 E9/L (ref 1.5–4)
LYMPHOCYTES RELATIVE PERCENT: 32.4 % (ref 20–42)
MCH RBC QN AUTO: 26.8 PG (ref 26–35)
MCHC RBC AUTO-ENTMCNC: 33.1 % (ref 32–34.5)
MCV RBC AUTO: 80.8 FL (ref 80–99.9)
MONOCYTES ABSOLUTE: 0.77 E9/L (ref 0.1–0.95)
MONOCYTES RELATIVE PERCENT: 8.9 % (ref 2–12)
NEUTROPHILS ABSOLUTE: 5 E9/L (ref 1.8–7.3)
NEUTROPHILS RELATIVE PERCENT: 57.9 % (ref 43–80)
PDW BLD-RTO: 16.9 FL (ref 11.5–15)
PLATELET # BLD: 330 E9/L (ref 130–450)
PMV BLD AUTO: 9.5 FL (ref 7–12)
POTASSIUM SERPL-SCNC: 3.6 MMOL/L (ref 3.5–5)
RBC # BLD: 3.96 E12/L (ref 3.5–5.5)
SODIUM BLD-SCNC: 141 MMOL/L (ref 132–146)
TOTAL PROTEIN: 8.3 G/DL (ref 6.4–8.3)
WBC # BLD: 8.7 E9/L (ref 4.5–11.5)

## 2022-11-23 PROCEDURE — 99203 OFFICE O/P NEW LOW 30 MIN: CPT | Performed by: SURGERY

## 2022-11-23 PROCEDURE — 71046 X-RAY EXAM CHEST 2 VIEWS: CPT

## 2022-11-23 PROCEDURE — 1036F TOBACCO NON-USER: CPT | Performed by: SURGERY

## 2022-11-23 PROCEDURE — G8420 CALC BMI NORM PARAMETERS: HCPCS | Performed by: SURGERY

## 2022-11-23 PROCEDURE — G8484 FLU IMMUNIZE NO ADMIN: HCPCS | Performed by: SURGERY

## 2022-11-23 PROCEDURE — G8427 DOCREV CUR MEDS BY ELIG CLIN: HCPCS | Performed by: SURGERY

## 2022-11-23 NOTE — PATIENT INSTRUCTIONS
Genetic testing and lab work completed today. Breast MRI will be authorized then you will hear from schedulers.

## 2022-11-23 NOTE — PROGRESS NOTES
Date of Visit: 2022  New Patient Invasive Breast Cancer    22      DIAGNOSIS:  1. (22) LEFT (2:00) invasive ductal carcinoma  Clinical stage: T1a(2mm)N0M0  ER (95) HI (95) HER2 (0) Ki67 (5)  2. Sickle cell trait    IMAGING/PROCEDURES:  1. (22) BILATERAL d-mammogram ([place]): BIRADS-4  * LEFT breast calcifications  * Preop for augmentation  2. (22) LEFT stereotactic biopsy  *dk 1.7 x 1.7cm    PREVISIT PLAN:  * Review for extent of calcs with BS  * MRI      HISTORY OF PRESENT ILLNESS  Bea Alexander was in the office today for her consultation regarding a diagnosis of left breast cancer. Krystle underwent imaging studies in preparation for breast augmentation. There were calcifications noted in the left breast.  Stereotactic biopsy demonstrated the presence of 2 mm of invasive cancer and associated DCIS. The patient is in the office now to discuss the above and receive any additional recommendations. BREAST SYMPTOMS  Krystle has no symptoms to report. Specifically, she has no palpable masses. She has had no nipple discharge or retraction. She has no skin retraction or discoloration. PAST BREAST HISTORY  Meli Diego has no prior history of breast biopsies or breast surgeries. BREAST CANCER RISK FACTORS  Family history:  There is no family history of breast or ovary cancer    PAST MEDICAL/SURGICAL HISTORY  Past Medical History:   Diagnosis Date    Abnormal Pap smear     repeat was normal    Diabetes mellitus ([facility] [address] 2014    Sickle cell trait ([facility] [address]      Past Surgical History:   Procedure Laterality Date    BREAST BIOPSY       SECTION      LAPAROSCOPY      [facility] STEROTACTIC LOC BREAST BIOPSY LEFT Left 2022    DONNELL STEROTACTIC LOC BREAST BIOPSY LEFT 2022 [facility]       MEDICATIONS    Current Outpatient Medications:     metFORMIN (GLUCOPHAGE) 1000 MG tablet, Take 1 tablet by mouth 2 times daily (with meals), Disp: 60 tablet, Rfl: 3    blood glucose monitor kit and supplies, Dispense sufficient amount for indicated testing frequency plus additional to accommodate PRN testing needs. Dispense all needed supplies to include: monitor, strips, lancing device, lancets, control solutions, alcohol swabs. (Patient not taking: Reported on 11/23/2022), Disp: 1 kit, Rfl: 0    glimepiride (AMARYL) 2 MG tablet, Take 1 tablet by mouth Daily with supper. (Patient not taking: No sig reported), Disp: 30 tablet, Rfl: 3    lanolin ointment, Apply to nipples as needed (Patient not taking: No sig reported), Disp: 1 Tube, Rfl: 3    ALLERGIES  No Known Allergies    SOCIAL HISTORY   reports that she has never smoked. She has never used smokeless tobacco. She reports current alcohol use. She reports that she does not currently use drugs. REVIEW OF SYSTEMS  Krystle describes her health to be at baseline. She is active. She has no cognitive symptoms. She has no chest pain palpitations or pedal edema. She has no shortness of breath at rest or cough. She has no abdominal pain reflux symptoms or change in her bowel habits. She has no hematuria dysuria. She has no new or worsening bone or joint pain. She has had no weakness paresthesias or any other neurologic symptoms. PHYSICAL EXAMINATION  /60   Pulse 90   Temp 98.6 °F (37 °C) (Temporal)   Resp 16   Ht 5' 1\" (1.549 m)   Wt 112 lb (50.8 kg)   LMP 10/29/2022   SpO2 100%   BMI 21.16 kg/m²   The patient is pleasant and in no distress  Auscultation of the heart demonstrates a regular rate and rhythm without murmurs  Breath sounds clear  Abdomen nondistended  No edema in the extremities  No gross neurologic deficits    COMPREHENSIVE BREAST EXAMINATION  There are no cervical, supraclavicular, or infraclavicular lymph nodes that are palpable. Inspection of the breast bilaterally demonstrates there to be no secondary signs of malignancy. There are no lesions of the nipple areola on either side.   No spontaneous discharges witnessed. Palpation of the axilla bilaterally is without adenopathy. Palpation of the breast demonstrates no masses. BREAST IMAGING STUDIES  I did review the recent mammogram.  While there are vascular calcifications in the breast there does appear to be an area of pleomorphic calcifications in the upper outer quadrant. I personally measured this to be approximately 1.7 cm. There may be other calcifications as well. PATHOLOGY  Review the pathology report demonstrates the presence of 2 mm of invasive cancer. ASSESSMENT AND PLAN  Holland Romeo was in the office today for her consultation regarding a diagnosis of left breast cancer. I had a discussion today with Krystle regarding the diagnosis as well as recommended treatment options. I did spend 30 minutes on the visit over half of which was dedicated to education counseling and decision making. Some of this time does include chart and/or imaging reviewed outside of the room time. As with all patients we began with a conceptual discussion of breast cancer as it relates to local, regional and systemic risk and therapy. We discussed the role of surgery and/or radiation and/or systemic therapies in breast cancer based on NCCN guidelines. I shared with her the equivalency of breast conserving therapy in comparison to mastectomy for most patients with early stage disease. In the scenario where a patient would require or choose a mastectomy we did discuss reconstructive options. We also talked about the rationale and techniques of sampling or clearing axillary lymph nodes in breast cancer patients. I informed the patient of the need to meet with medical and radiation oncology to discuss adjuvant therapies following completion of the surgical procedure. With respect to her case specifically I informed Krystle that I believe she will be a good candidate for breast conserving therapy.   Therefore my recommendation was for a mag seed localized lumpectomy and sentinel lymph node biopsy. Today in the office I explained to her the procedural aspects of the surgery. We talked about the most common, but unlikely, complications including bleeding, infection, cosmetic imperfections, nerve injury and lymphedema. I also cautioned her as to the possibility of a second surgery if there are issues with surgical margins and or significant lindsay involvement. .      Prior to scheduling her surgery I would like to have her undergo a breast MRI. Her breast tissue is very dense and we went to assure localized disease. Lastly, while she has no family history she is very young and based on guidelines we will recommend genetic testing. At this point we await the above testing we will make the appropriate recommendations when the results are available. This note was created with voice recognition software. Please excuse any grammatical errors that were not corrected and please contact me if there are any questions. [email or web address]  [phone]

## 2022-11-23 NOTE — TELEPHONE ENCOUNTER
Met with patient regarding her recent breast cancer diagnosis at surgical consultation appointment with Kevin Philippe. Reviewed pathology report. Instructed patient on her breast biopsy pathology findings including cancer type ([facility]) and hormone receptor status (ER+, NH+, Her2-). Instructed on next steps including breast surgery options per 's recommendations and any additional imaging that may be required. Provided with extensive literature including \"Be A Survivor: Your guide to Breast Cancer Treatment\", chapter 4 reviewed, Your Guide to Your Breast Cancer Pathology Report, American Cancer Society Exercises after Breast Surgery and Lymphedema Early Signs and Symptoms. Today patient received copy of their pathology report as well as a list of local medical oncology providers, information on diagnosis and local support group resources. Provided patient with my contact information and encouraged to call me with questions or concerns. Patient verbalizes understanding and appreciative of nurse navigator visit.  TRINH Henley,RN-OCN

## 2022-11-30 DIAGNOSIS — C50.912 MALIGNANT NEOPLASM OF LEFT FEMALE BREAST, UNSPECIFIED ESTROGEN RECEPTOR STATUS, UNSPECIFIED SITE OF BREAST (HCC): ICD-10-CM

## 2022-12-02 ENCOUNTER — TELEPHONE (OUTPATIENT)
Dept: BREAST CENTER | Age: 42
End: 2022-12-02

## 2022-12-02 NOTE — TELEPHONE ENCOUNTER
RN was notified by Marlene Jesus that patient called and [facility] on 11/30/2022 to schedule an appointment with [facility]. RN looked into patient chart to see that patient was seen on 11/23/2022 with Damein Weiss. RN contacted patient to see what exactly she was requesting. Patient states she would like to see a female doctor to have explain her diagnosis and review everything with her. RN explained she would send a message to  to see if she was willing to see patient as she has already seen Damien Weiss. Patient states it was nothing against Damien Weiss and he was a good Dr just wants to see a female. RN verbalized understanding.          Electronically signed by Rasta Amezcua RN on 12/2/22 at 12:19 PM EST

## 2022-12-02 NOTE — TELEPHONE ENCOUNTER
I offered to answer questions in regards to what she had but she just stated she wanted to speak to a female breast surgeon in regards to everything. Patient stated she was checking with us before going to [facility].          Electronically signed by Jessica Johnson RN on 12/2/22 at 2:08 PM EST

## 2022-12-06 NOTE — PROGRESS NOTES
[facility]  DATE OF VISIT : 2022    Patient : Paulina Gilliam   Age : 36 y.o.  : 1980   MRN : [id number]   ______________________________________________________________________    Chief Complaint :   Chief Complaint   Patient presents with    Follow-up Chronic Condition     Patient is here today to follow up for DM and breast cancer recently diagnosed. A1C done today in office. Medications have been reviewed no changes at this time. Breast Cancer     Left breast cancer         HPI : Paulina Gilliam is 36 y.o. female who presented to the clinic today for office visit. Malignant neoplasm of the left breast: Invasive ductal carcinoma, clinical stage T1a. Initially found on mammo ordered for breast augmentation consutlation. Has stereotactic biopsy done. Now following with breast cancer center plan for MRI of the breast prior to surgery. Patient is requesting a second opinion and would like to go to [facility] clinic. NIDDM: Controlled. Last A1C 6.8. On metformin 1000mg BID. Previously on glimepiride but stopped over 2mos ago. BG at home have consistently been within normal range. I reviewed the patient's past medications, allergies and past medical history during this visit.     Past Medical History :  Past Medical History:   Diagnosis Date    Abnormal Pap smear     repeat was normal    Diabetes mellitus ([facility] [address] 2014    Sickle cell trait ([facility] [address]      Past Surgical History:   Procedure Laterality Date    BREAST BIOPSY       SECTION      LAPAROSCOPY      [facility] BREAST BIOPSY LEFT Left 2022    [facility] STEROTACT[facility] LOC BREAST BIOPSY LEFT 2022 [facility]       Social History :  Social History       Tobacco History       Smoking Status  Never      Smokeless Tobacco Use  Never              Alcohol History       Alcohol Use Status  Yes Comment  a bottle in one sitting on the weekends              Drug Use       Drug Use Status  Not Currently Comment  previously smoked marijuana              Sexual Activity       Sexually Active  Not Asked                     Allergies :   No Known Allergies    Medication List :    Current Outpatient Medications   Medication Sig Dispense Refill    metFORMIN (GLUCOPHAGE) 1000 MG tablet Take 1 tablet by mouth 2 times daily (with meals) 60 tablet 3    blood glucose monitor kit and supplies Dispense sufficient amount for indicated testing frequency plus additional to accommodate PRN testing needs. Dispense all needed supplies to include: monitor, strips, lancing device, lancets, control solutions, alcohol swabs. (Patient not taking: No sig reported) 1 kit 0    glimepiride (AMARYL) 2 MG tablet Take 1 tablet by mouth Daily with supper. (Patient not taking: No sig reported) 30 tablet 3    lanolin ointment Apply to nipples as needed (Patient not taking: No sig reported) 1 Tube 3     No current facility-administered medications for this visit. Review of Systems :  Review of Systems   Constitutional:  Negative for chills, fatigue and fever. Respiratory:  Negative for cough, shortness of breath and wheezing. Cardiovascular:  Negative for chest pain, palpitations and leg swelling. Gastrointestinal:  Negative for abdominal pain, constipation, diarrhea, nausea and vomiting. Endocrine: Negative for polydipsia and polyuria. Neurological:  Negative for dizziness, weakness, light-headedness, numbness and headaches.   ______________________________________________________________________    Physical Exam :    Vitals: /60 (Site: Left Upper Arm, Position: Sitting, Cuff Size: Small Adult)   Pulse 96   Temp 98.1 °F (36.7 °C) (Temporal)   Resp 16   Ht 5' 1\" (1.549 m)   Wt 112 lb 11.2 oz (51.1 kg)   SpO2 97%   BMI 21.29 kg/m²   Physical Exam  Vitals reviewed. Constitutional:       General: She is not in acute distress. Appearance: Normal appearance. She is not ill-appearing. Cardiovascular:      Rate and Rhythm: Normal rate and regular rhythm. Pulses: Normal pulses. Heart sounds: Normal heart sounds. No murmur heard. Pulmonary:      Effort: Pulmonary effort is normal. No respiratory distress. Breath sounds: Normal breath sounds. No wheezing. Abdominal:      General: Bowel sounds are normal. There is no distension. Palpations: Abdomen is soft. Tenderness: There is no abdominal tenderness. Musculoskeletal:      Right lower leg: No edema. Left lower leg: No edema. Neurological:      Mental Status: She is alert.         ___________________    Assessment & Plan :    1. Invasive ductal carcinoma of breast, female, left [facility])  -All questions answered to patient  -Referral placed to Dr. Cristobal Power. Mary Romeo in [facility]  - External Referral To General Surgery    2. Type 2 diabetes mellitus without complication, without long-term current use of insulin (HCC)  -A1c today 6.5  -Can continue with metformin alone  -Discussed again lifestyle and dietary modifications to help maintain her A1c at goal.    Educational materials and/or home exercises printed for patient's review and were included in patient instructions on his/her After Visit Summary and given to patient at the end of visit. Counseled regarding above diagnosis, including possible risks and complications,  especially if left uncontrolled. Counseled regarding the possible side effects, risks, benefits and alternatives to treatment; patient and/or guardian verbalizes understanding, agrees, feels comfortable with and wishes to proceed with above treatment plan. Advised patient to call with any new medication issues, and read all Rx info from pharmacy to assure aware of all possible risks and side effects of medication before taking. Reviewed age and gender appropriate health screening exams and vaccinations.   Advised patient regarding importance of keeping up with recommended health maintenance and to schedule as soon as possible if overdue, as this is important in assessing for undiagnosed pathology, especially cancer, as well as protecting against potentially harmful/life threatening disease. Patient and/or guardian verbalizes understanding and agrees with above counseling, assessment and plan. All questions answered    Additional plan and future considerations:   RTO in 6 months for diabetic follow-up or sooner if needed. Return to Office: No follow-ups on file.     Electronically signed by Dayna Hernandez MD on 12/7/2022 at 10:59 AM

## 2022-12-06 NOTE — TELEPHONE ENCOUNTER
RN attempt to contact patient to discuss Puma Session recommends patient to stay with Niurka Gentry. RN reached patient VM and left detailed message of why was calling and office number for patient to call office back if has further questions.          Electronically signed by Freedom Nur RN on 12/6/22 at 10:56 AM EST

## 2022-12-07 ENCOUNTER — OFFICE VISIT (OUTPATIENT)
Dept: FAMILY MEDICINE CLINIC | Age: 42
End: 2022-12-07
Payer: MEDICAID

## 2022-12-07 ENCOUNTER — TELEPHONE (OUTPATIENT)
Dept: BREAST CENTER | Age: 42
End: 2022-12-07

## 2022-12-07 VITALS
WEIGHT: 112.7 LBS | HEIGHT: 61 IN | HEART RATE: 96 BPM | DIASTOLIC BLOOD PRESSURE: 60 MMHG | SYSTOLIC BLOOD PRESSURE: 104 MMHG | OXYGEN SATURATION: 97 % | RESPIRATION RATE: 16 BRPM | TEMPERATURE: 98.1 F | BODY MASS INDEX: 21.28 KG/M2

## 2022-12-07 DIAGNOSIS — C50.912 INVASIVE DUCTAL CARCINOMA OF BREAST, FEMALE, LEFT (HCC): Primary | ICD-10-CM

## 2022-12-07 DIAGNOSIS — E11.9 TYPE 2 DIABETES MELLITUS WITHOUT COMPLICATION, WITHOUT LONG-TERM CURRENT USE OF INSULIN (HCC): ICD-10-CM

## 2022-12-07 LAB — HBA1C MFR BLD: 6.5 %

## 2022-12-07 PROCEDURE — 99213 OFFICE O/P EST LOW 20 MIN: CPT | Performed by: FAMILY MEDICINE

## 2022-12-07 PROCEDURE — 83036 HEMOGLOBIN GLYCOSYLATED A1C: CPT | Performed by: FAMILY MEDICINE

## 2022-12-07 PROCEDURE — 1036F TOBACCO NON-USER: CPT | Performed by: FAMILY MEDICINE

## 2022-12-07 PROCEDURE — 3046F HEMOGLOBIN A1C LEVEL >9.0%: CPT | Performed by: FAMILY MEDICINE

## 2022-12-07 PROCEDURE — G8484 FLU IMMUNIZE NO ADMIN: HCPCS | Performed by: FAMILY MEDICINE

## 2022-12-07 PROCEDURE — G8427 DOCREV CUR MEDS BY ELIG CLIN: HCPCS | Performed by: FAMILY MEDICINE

## 2022-12-07 PROCEDURE — G8420 CALC BMI NORM PARAMETERS: HCPCS | Performed by: FAMILY MEDICINE

## 2022-12-07 PROCEDURE — 2022F DILAT RTA XM EVC RTNOPTHY: CPT | Performed by: FAMILY MEDICINE

## 2022-12-07 ASSESSMENT — ENCOUNTER SYMPTOMS
CONSTIPATION: 0
SHORTNESS OF BREATH: 0
VOMITING: 0
NAUSEA: 0
DIARRHEA: 0
ABDOMINAL PAIN: 0
COUGH: 0
WHEEZING: 0

## 2022-12-07 NOTE — TELEPHONE ENCOUNTER
Attempted to touch base with patient as she had previously asked about switching to a female surgeon. Call back number provided in voicemail for patient to return call to discuss options.

## 2022-12-07 NOTE — TELEPHONE ENCOUNTER
Patient returned call. Discussed the available time for her to see one of our female providers. I also offered the option of additional female providers at the [facility], contact information provided. She is going to see her PCP to discuss the possibility of having her referral information sent to the [facility] to be evaluate this month.

## 2022-12-21 ENCOUNTER — TELEPHONE (OUTPATIENT)
Dept: BREAST CENTER | Age: 42
End: 2022-12-21

## 2022-12-21 NOTE — TELEPHONE ENCOUNTER
Authorization has been obtained for breast MRI 72965 - Approval# [id number] valid until 2/4/23 per [address] / sally Ordaz. / Case# [id number]

## 2023-01-09 ENCOUNTER — TELEPHONE (OUTPATIENT)
Dept: FAMILY MEDICINE CLINIC | Age: 43
End: 2023-01-09

## 2023-01-09 NOTE — TELEPHONE ENCOUNTER
----- Message from Oropeza Theo sent at 1/9/2023  1:30 PM EST -----  Subject: Message to Provider    QUESTIONS  Information for Provider? Patient is scheduled for an MRI on 1/10/2023. She needs something due to she is claustrophobic. She needs something to   help her stay calm. Pharmacy? [facility] on [address].   ---------------------------------------------------------------------------  --------------  Keely HUBERMARYBETH  [phone]; OK to leave message on voicemail  ---------------------------------------------------------------------------  --------------  SCRIPT ANSWERS  Relationship to Patient?  Self

## 2023-01-10 ENCOUNTER — HOSPITAL ENCOUNTER (OUTPATIENT)
Dept: MRI IMAGING | Age: 43
Discharge: HOME | End: 2023-01-12
Payer: MEDICAID

## 2023-01-10 DIAGNOSIS — C50.912 MALIGNANT NEOPLASM OF LEFT FEMALE BREAST, UNSPECIFIED ESTROGEN RECEPTOR STATUS, UNSPECIFIED SITE OF BREAST (HCC): ICD-10-CM

## 2023-01-10 PROCEDURE — C8908 MRI W/O FOL W/CONT, BREAST,: HCPCS

## 2023-01-10 PROCEDURE — A9585 GADOBUTROL INJECTION: HCPCS | Performed by: RADIOLOGY

## 2023-01-10 PROCEDURE — 6360000004 HC RX CONTRAST MEDICATION: Performed by: RADIOLOGY

## 2023-01-10 RX ADMIN — GADOBUTROL 6 ML: 604.72 INJECTION INTRAVENOUS at 11:53

## 2023-01-11 ENCOUNTER — TELEPHONE (OUTPATIENT)
Dept: BREAST CENTER | Age: 43
End: 2023-01-11

## 2023-01-11 NOTE — TELEPHONE ENCOUNTER
Patient returned call from Dr Roberto Nayak regarding her breast MRI. Told patient I will message Dr Roberto Nayak and he will call her back sometime today.     Electronically signed by Lissett Lei RN on 1/11/23 at 8:54 AM EST

## 2023-01-12 ENCOUNTER — TELEPHONE (OUTPATIENT)
Dept: BREAST CENTER | Age: 43
End: 2023-01-12

## 2023-01-12 NOTE — TELEPHONE ENCOUNTER
I discussed MRI findings with Krystle.     Please schedule: LEFT MagSeed localized lumpectomy and SLNB

## 2023-01-18 ENCOUNTER — TELEPHONE (OUTPATIENT)
Dept: BREAST CENTER | Age: 43
End: 2023-01-18

## 2023-01-24 ENCOUNTER — PREP FOR PROCEDURE (OUTPATIENT)
Dept: BREAST CENTER | Age: 43
End: 2023-01-24

## 2023-01-24 ENCOUNTER — TELEPHONE (OUTPATIENT)
Dept: FAMILY MEDICINE CLINIC | Age: 43
End: 2023-01-24

## 2023-01-24 DIAGNOSIS — C50.912 MALIGNANT NEOPLASM OF LEFT FEMALE BREAST, UNSPECIFIED ESTROGEN RECEPTOR STATUS, UNSPECIFIED SITE OF BREAST (HCC): Primary | ICD-10-CM

## 2023-01-24 NOTE — TELEPHONE ENCOUNTER
----- Message from [employer] sent at 1/24/2023 12:19 PM EST -----  Subject: Referral Request    Reason for referral request? EKG for surgery  Provider patient wants to be referred to(if known):     Provider Phone Number(if known):     Additional Information for Provider?   ---------------------------------------------------------------------------  --------------  [id number] [email or web address]    [phone]; OK to leave message on voicemail  ---------------------------------------------------------------------------  --------------

## 2023-01-27 ENCOUNTER — TELEPHONE (OUTPATIENT)
Dept: BREAST CENTER | Age: 43
End: 2023-01-27

## 2023-01-27 NOTE — TELEPHONE ENCOUNTER
Patient surgery has been scheduled with surgery scheduling 2/7/23 @ 10:00am / Arrival 8:00am / Titus Moreland seed placement - 2/6/23 @ 8:00am MADIHA - Left breast bracketed 2 mag seed localized lumpectomy - LMAC. Patient notified of date and time of surgery. Patient was instructed to enter the [facility] entrance of the hospital. Patient was instructed NPO after midnight the night prior to surgery. Patient was instructed NO ASA products or blood thinners 3-5days prior to surgery. Patient was instructed to bring a sports bra with her day of surgery. Preadmission testing will contact patient prior to surgery, to go over medications and any additional testing that may need to be completed. Prior authorization will be obtained.  Post op visit 2/22/23 @ 8:30am MT

## 2023-01-31 NOTE — PROGRESS NOTES
[address]   PRE-ADMISSION TESTING GENERAL INSTRUCTIONS  [facility] Phone Number: [phone]      GENERAL INSTRUCTIONS: the time of surgery may be changed, we will call the day before with updated time. [x] Antibacterial Soap Shower Night before and/or AM of surgery. [] CHG Wipes instruction sheet and wipes given. [] Hibiclens shower the night before and the morning of surgery.   -Do not use Hibiclens on your face or head. [x] Do not wear makeup, lotions, powders, deodorant. [x] Nothing by mouth after midnight; including gum, candy, mints, or water. [x] You may brush your teeth, gargle, but do NOT swallow water. [] No tobacco products, illegal drugs, or alcohol within 24 hours of your surgery. [x] Jewelry or valuables should not be brought to the hospital. All body and/or tongue piercing's must be removed prior to arriving to hospital. No contact lens or hair pins. [x] Arrange transportation with a responsible adult  to and from the hospital. Arrange for someone to be with you for the remainder of the day and for 24 hours after your procedure due to having had anesthesia. -Who will be your  for transportation?___luc hi/horace_______________         -Who will be staying with you for 24 hrs after your procedure?_____sussy_____________  [x] Bring insurance card and photo ID.  [] Bring copy of living will or healthcare power of  papers to be placed in your electronic record. [x] Urine Pregnancy test will be preformed the day of surgery. A specimen sample may be brought from home. [] Transfusion Bracelet: Please bring with you to hospital, day of surgery. PARKING INSTRUCTIONS:     [x] ARRIVAL DATE & TIME: 2/7 @ 0800  [x] Enter into the [employer]. Two people may accompany you. Masks are not required but are recommended. [x] Parking Lot \"I\" is where you will park. It is located on the corner of [facility] [address] [facility].  The entrance is on [address] [address] [facility]. Upon entering the parking lot, a voucher ticket will print    EDUCATION INSTRUCTIONS:        [] Knee or Hip replacement booklet & exercise pamphlets given. [] Janette 77 placed in chart. [] Pre-admission Testing educational folder given  [] Incentive Spirometry,coughing & deep breathing exercises reviewed. [] Medication information sheet(s)   [x] Fluoroscopy-Xray used in surgery reviewed with patient. Educational pamphlet placed in chart. [x] Pain: Post-op pain is normal and to be expected. You will be asked to rate your pain from 0-10. Please ask for pain med is needed. [] Joint camp offered. [] Joint replacement booklets given.  [] Spine Navigator to see in PAT. [x] Other:__bring bra, wear front closure shirt, easier to wear after surgery. _________________________    MEDICATION INSTRUCTIONS:    [] Bring a complete list of your medications, please write the last time you took the medicine, give this list to the nurse in Pre-Op. [] Take only the following medications the morning of surgery with 1-2 ounces of water:   [] Stop all herbal supplements and vitamins 5 days before surgery. Stop NSAIDS 7 days before surgery. [x] DO NOT take any diabetic medicine the morning of surgery. Follow instructions for insulin the day before surgery. [] If you are diabetic and your blood sugar is low or you feel symptomatic, you may drink 1-2 ounces of apple juice or take a glucose tablet.            -The morning of your procedure, you may call the pre-op area if you have concerns about your blood sugar [phone]. [] Use your inhalers the morning of surgery. Bring your emergency inhaler with you day of surgery. [] Follow physician instructions regarding any blood thinners you may be taking.     WHAT TO EXPECT:    [x] The day of surgery you will be greeted and checked in by the [employer].  In addition, you will be registered in the [facility] by a Patient Access Representative. Please bring your photo ID and insurance card. A nurse will greet you in accordance to the time you are needed in the pre-op area to prepare you for surgery. Please do not be discouraged if you are not greeted in the order you arrive as there are many variables that are involved in patient preparation. Your patience is greatly appreciated as you wait for your nurse. Please bring in items such as: books, magazines, newspapers, electronics, or any other items  to occupy your time in the waiting area. [x]  Delays may occur with surgery and staff will make a sincere effort to keep you informed of delays. If any delays occur with your procedure, we apologize ahead of time for your inconvenience as we recognize the value of your time.

## 2023-02-01 ENCOUNTER — OFFICE VISIT (OUTPATIENT)
Dept: FAMILY MEDICINE CLINIC | Age: 43
End: 2023-02-01
Payer: MEDICAID

## 2023-02-01 VITALS
HEART RATE: 89 BPM | OXYGEN SATURATION: 99 % | SYSTOLIC BLOOD PRESSURE: 96 MMHG | DIASTOLIC BLOOD PRESSURE: 68 MMHG | HEIGHT: 67 IN | RESPIRATION RATE: 16 BRPM | WEIGHT: 116 LBS | TEMPERATURE: 97.9 F | BODY MASS INDEX: 18.21 KG/M2

## 2023-02-01 DIAGNOSIS — Z01.818 PRE-OP EXAM: Primary | ICD-10-CM

## 2023-02-01 PROCEDURE — 93005 ELECTROCARDIOGRAM TRACING: CPT | Performed by: FAMILY MEDICINE

## 2023-02-01 SDOH — ECONOMIC STABILITY: HOUSING INSECURITY
IN THE LAST 12 MONTHS, WAS THERE A TIME WHEN YOU DID NOT HAVE A STEADY PLACE TO SLEEP OR SLEPT IN A SHELTER (INCLUDING NOW)?: NO

## 2023-02-01 SDOH — ECONOMIC STABILITY: FOOD INSECURITY: WITHIN THE PAST 12 MONTHS, THE FOOD YOU BOUGHT JUST DIDN'T LAST AND YOU DIDN'T HAVE MONEY TO GET MORE.: SOMETIMES TRUE

## 2023-02-01 SDOH — ECONOMIC STABILITY: FOOD INSECURITY: WITHIN THE PAST 12 MONTHS, YOU WORRIED THAT YOUR FOOD WOULD RUN OUT BEFORE YOU GOT MONEY TO BUY MORE.: SOMETIMES TRUE

## 2023-02-01 SDOH — ECONOMIC STABILITY: INCOME INSECURITY: HOW HARD IS IT FOR YOU TO PAY FOR THE VERY BASICS LIKE FOOD, HOUSING, MEDICAL CARE, AND HEATING?: NOT HARD AT ALL

## 2023-02-01 ASSESSMENT — PATIENT HEALTH QUESTIONNAIRE - PHQ9
1. LITTLE INTEREST OR PLEASURE IN DOING THINGS: 1
SUM OF ALL RESPONSES TO PHQ9 QUESTIONS 1 & 2: 2
SUM OF ALL RESPONSES TO PHQ QUESTIONS 1-9: 2
2. FEELING DOWN, DEPRESSED OR HOPELESS: 1

## 2023-02-01 NOTE — PROGRESS NOTES
CC: Pre-op Exam (Patient [facility] @ 10:00 a.m[facility] Patient is having a Bracketed Mag Seed)      HPI:  Bea Alexander is a 36 y.o. yo female presents for a preoperative consultation at the request of surgeon, Dr. Kiley Ramirez, who plans on performing Left breast lumpectomy on 2/7/23 at [facility]. Pre-Operative Risk assessment using 2014 ACC/AHA guidelines for non-cardiac surgery  Emergent procedure: No    Active Cardiac Condition: No  Risk Level of Procedure:   [] Low (0-1% of adverse cardiac events): superficial procedures, cataract/ breast surgery, endoscopy, superficial skin, ambulatory procedures. [x] Intermediate (1-5%): carotid endarterectomy, intraperitoneal/intrathoracic surgery, orthopedic surgery, head and neck surgery, and prostate surgery. [] High (> 5%): vascular or aortic repair surgery. Measurement of Exercise Tolerance before Surgery >4 without symptoms: No. Pt can Walk indoors, such as around the house (1.75 METs), Do light work around the house, such as dusting or washing dishes (2.70 METs), Take care of self, that is eating, dressing, bathing, using the toilet (2.75 METs), Do moderate work around the house such as vacuuming, sweeping floors, or carrying in groceries (3.50 METs), Do yardwork, such as raking leaves, weeding,or pushing a power mower (4.50 METs), Have sexual relations (5.25 METs), and Climb a flight of stairs or walk up a hill (5.50 METs).    Assessment of Risk using RCRI (Revised Cardiac Risk Index) factors:   [] High-risk surgery   [] Ischemic heart disease   [] Hx of cerebrovascular disease  [] Hx of Heart failure  [] DM requiring insulin   [] Preop Cr > 2.0 mg/dL    Other items of interest:  Planned anesthesia: General; Known anesthesia problems: None   Bleeding risk: No recent or remote history of abnormal bleeding  Personal or FH of DVT/PE: No    Patient objection to receiving blood products: No  Planned overnight hospital stay: No   Hx of stent placement and need for continued DAPT or antiplatelet agent: No   BP controlled: No   DMII, if present, controlled: Yes - controlled last a1c 6.5   Lung disease, if present, controlled: No   Never smoker or currently smoking or quit at least 8 weeks prior to planned surgery: No     Pt denies fever, chills, sweats. Pt denies vision changes, headaches  Pt denies new chest pain, palpitations, orthopnea, dyspnea on exertion, leg swelling  Pt denies new cough or shortness of breath    Prior to Admission medications    Medication Sig Start Date End Date Taking? Authorizing Provider   blood glucose monitor kit and supplies Dispense sufficient amount for indicated testing frequency plus additional to accommodate PRN testing needs. Dispense all needed supplies to include: monitor, strips, lancing device, lancets, control solutions, alcohol swabs. Patient not taking: No sig reported 10/20/22   Anuja Aguilar MD   metFORMIN (GLUCOPHAGE) 1000 MG tablet Take 1 tablet by mouth 2 times daily (with meals) 10/20/22   Anuja Aguilar MD       Holland Romeo  reports that she has never smoked. She has never used smokeless tobacco. She reports current alcohol use. She reports that she does not currently use drugs. has a past medical history of Abnormal Pap smear, Cancer ([facility] [address] Diabetes mellitus ([facility] [address] and Sickle cell trait ([facility] [address] No Known Allergies    I reviewed the patient's past past medical history, past surgical history, family history, social history, medications, and allergies during this visit    Vitals:   BP 96/68   Pulse 89   Temp 97.9 °F (36.6 °C) (Temporal)   Resp 16   Ht 5' 7\" (1.702 m)   Wt 116 lb (52.6 kg)   LMP 01/26/2023   SpO2 99%   BMI 18.17 kg/m²     PE:  Constitutional - alert and oriented, well appearing, and in no distress  Eyes: Conjunctivae and EOM are normal. Pupils are equal, round, and reactive to light. Neck - supple, no significant adenopathy, No JVD present;    Respiratory- clear to auscultation, no wheezes, rales or rhonchi, symmetric air entry; no increased work of breathing  Cardiovascular - normal rate, regular rhythm, normal S1, S2, no murmurs, rubs, clicks or gallops; Extremities - peripheral pulses normal, no pedal edema, no clubbing or cyanosis  Abdomen - soft, nontender, nondistended, no masses or organomegaly  Musculoskeletal - no clubbing, cyanosis of extremities noted; no joint deformity or swelling noted; full active range of motion noted without pain  Skin - normal coloration and turgor, no rashes, no suspicious skin lesions noted  Neurological - no obvious CN deficits; normal speech, no focal findings or movement disorder noted  Psychiatric - alert, oriented; has a normal mood and affect; behavior is normal    EKG Interpretation:  normal EKG, normal sinus rhythm, unchanged from previous tracings. Lab Review not applicable     Data:  Pertinent labs, imaging, other diagnostic data and other clinician documentation reviewed in electronic medical record. A / P:   Diagnosis Orders   1. Pre-op exam  EKG 12 Lead - Clinic Performed          Preoperative workup as follows: EKG: normal EKG, normal sinus rhythm, unchanged from previous tracings; additional studies ordered: none   Prophylaxis for cardiac events with perioperative beta-blockers: Not indicated  Additional medication recommendations: Diabetes Oral hypoglycemic agents - hold oral hypoglycemic agents for the morning of surgery. Resume oral hypoglycemic agents post procedure with diet. None  Deep vein thrombosis prophylaxis: regimen to be chosen by surgical team  Further recommendations from consultants: None  At the time of this evaluation: No contraindications to planned surgery.    According to the 2014 ACC/AHA pre-operative risk assessment guidelines Ivonne Sutton is at 1.0-1.3% risk (1 independent predictor) for Major Adverse Cardiac Complications according to the Revised Cardiac Risk Index during a/an intermediate risk procedure. The risks of surgery and the patient's personal risk factors were discussed. She was advised to discuss surgery specific risks v benefits and alternative treatment plans with surgeon if not done so already.  Medications recommended to continue the day of surgery should be taken with a sip of water even when NPO.   RTO: 7 - 14 days post-op with PCP and/or surgical team as directed      An electronic signature was used to authenticate this note.  ---- Billy Shanks MD on 2/1/2023 at 11:08 AM                    Electronically signed by Billy Shanks MD on 2/1/2023 at 11:08 AM

## 2023-02-02 DIAGNOSIS — C50.912 MALIGNANT NEOPLASM OF LEFT FEMALE BREAST, UNSPECIFIED ESTROGEN RECEPTOR STATUS, UNSPECIFIED SITE OF BREAST (HCC): Primary | ICD-10-CM

## 2023-02-02 RX ORDER — SODIUM CHLORIDE 0.9 % (FLUSH) 0.9 %
5-40 SYRINGE (ML) INJECTION PRN
Status: CANCELLED | OUTPATIENT
Start: 2023-02-02

## 2023-02-02 RX ORDER — SODIUM CHLORIDE 0.9 % (FLUSH) 0.9 %
5-40 SYRINGE (ML) INJECTION EVERY 12 HOURS SCHEDULED
Status: CANCELLED | OUTPATIENT
Start: 2023-02-02

## 2023-02-02 RX ORDER — SODIUM CHLORIDE 9 MG/ML
INJECTION, SOLUTION INTRAVENOUS PRN
Status: CANCELLED | OUTPATIENT
Start: 2023-02-02

## 2023-02-02 RX ORDER — SODIUM CHLORIDE, SODIUM LACTATE, POTASSIUM CHLORIDE, CALCIUM CHLORIDE 600; 310; 30; 20 MG/100ML; MG/100ML; MG/100ML; MG/100ML
INJECTION, SOLUTION INTRAVENOUS CONTINUOUS
Status: CANCELLED | OUTPATIENT
Start: 2023-02-02

## 2023-02-02 NOTE — H&P
that were not corrected and please contact me if there are any questions. [email or web address]  [phone]

## 2023-02-06 ENCOUNTER — HOSPITAL ENCOUNTER (OUTPATIENT)
Dept: GENERAL RADIOLOGY | Age: 43
Discharge: HOME | End: 2023-02-08
Payer: MEDICAID

## 2023-02-06 ENCOUNTER — ANESTHESIA EVENT (OUTPATIENT)
Dept: OPERATING ROOM | Age: 43
End: 2023-02-06
Payer: MEDICAID

## 2023-02-06 ENCOUNTER — HOSPITAL ENCOUNTER (OUTPATIENT)
Dept: GENERAL RADIOLOGY | Age: 43
End: 2023-02-06
Payer: MEDICAID

## 2023-02-06 DIAGNOSIS — C50.912 MALIGNANT NEOPLASM OF LEFT FEMALE BREAST, UNSPECIFIED ESTROGEN RECEPTOR STATUS, UNSPECIFIED SITE OF BREAST (HCC): ICD-10-CM

## 2023-02-06 PROCEDURE — 19281 PERQ DEVICE BREAST 1ST IMAG: CPT

## 2023-02-06 PROCEDURE — 2500000003 HC RX 250 WO HCPCS

## 2023-02-07 ENCOUNTER — HOSPITAL ENCOUNTER (OUTPATIENT)
Dept: NUCLEAR MEDICINE | Age: 43
Discharge: HOME | End: 2023-02-09
Payer: MEDICAID

## 2023-02-07 ENCOUNTER — HOSPITAL ENCOUNTER (OUTPATIENT)
Age: 43
Setting detail: OUTPATIENT SURGERY
Discharge: HOME | End: 2023-02-07
Attending: SURGERY | Admitting: SURGERY
Payer: MEDICAID

## 2023-02-07 ENCOUNTER — HOSPITAL ENCOUNTER (OUTPATIENT)
Dept: GENERAL RADIOLOGY | Age: 43
Discharge: HOME | End: 2023-02-09
Payer: MEDICAID

## 2023-02-07 ENCOUNTER — ANESTHESIA (OUTPATIENT)
Dept: OPERATING ROOM | Age: 43
End: 2023-02-07
Payer: MEDICAID

## 2023-02-07 VITALS
BODY MASS INDEX: 21.34 KG/M2 | SYSTOLIC BLOOD PRESSURE: 135 MMHG | RESPIRATION RATE: 15 BRPM | OXYGEN SATURATION: 99 % | WEIGHT: 113 LBS | DIASTOLIC BLOOD PRESSURE: 83 MMHG | TEMPERATURE: 97 F | HEIGHT: 61 IN | HEART RATE: 81 BPM

## 2023-02-07 DIAGNOSIS — C50.912 MALIGNANT NEOPLASM OF LEFT BREAST (HCC): ICD-10-CM

## 2023-02-07 DIAGNOSIS — G89.18 POST-OP PAIN: ICD-10-CM

## 2023-02-07 DIAGNOSIS — C50.212 MALIGNANT NEOPLASM OF UPPER-INNER QUADRANT OF LEFT FEMALE BREAST, UNSPECIFIED ESTROGEN RECEPTOR STATUS (HCC): ICD-10-CM

## 2023-02-07 DIAGNOSIS — C50.912 MALIGNANT NEOPLASM OF LEFT FEMALE BREAST, UNSPECIFIED ESTROGEN RECEPTOR STATUS, UNSPECIFIED SITE OF BREAST (HCC): ICD-10-CM

## 2023-02-07 DIAGNOSIS — Z01.818 PRE-OP TESTING: Primary | ICD-10-CM

## 2023-02-07 LAB
EKG ATRIAL RATE: 91 BPM
EKG P AXIS: 65 DEGREES
EKG P-R INTERVAL: 176 MS
EKG Q-T INTERVAL: 378 MS
EKG QRS DURATION: 86 MS
EKG QTC CALCULATION (BAZETT): 464 MS
EKG R AXIS: 73 DEGREES
EKG T AXIS: 63 DEGREES
EKG VENTRICULAR RATE: 91 BPM
HCG, URINE, POC: NEGATIVE
Lab: NORMAL
METER GLUCOSE: 147 MG/DL (ref 74–99)
NEGATIVE QC PASS/FAIL: NORMAL
POSITIVE QC PASS/FAIL: NORMAL

## 2023-02-07 PROCEDURE — 6360000002 HC RX W HCPCS: Performed by: SURGERY

## 2023-02-07 PROCEDURE — 88307 TISSUE EXAM BY PATHOLOGIST: CPT

## 2023-02-07 PROCEDURE — 6360000002 HC RX W HCPCS: Performed by: NURSE ANESTHETIST, CERTIFIED REGISTERED

## 2023-02-07 PROCEDURE — A4216 STERILE WATER/SALINE, 10 ML: HCPCS | Performed by: SURGERY

## 2023-02-07 PROCEDURE — 6370000000 HC RX 637 (ALT 250 FOR IP): Performed by: ANESTHESIOLOGY

## 2023-02-07 PROCEDURE — 93010 ELECTROCARDIOGRAM REPORT: CPT | Performed by: INTERNAL MEDICINE

## 2023-02-07 PROCEDURE — 7100000000 HC PACU RECOVERY - FIRST 15 MIN: Performed by: SURGERY

## 2023-02-07 PROCEDURE — 88341 IMHCHEM/IMCYTCHM EA ADD ANTB: CPT

## 2023-02-07 PROCEDURE — 2720000010 HC SURG SUPPLY STERILE: Performed by: SURGERY

## 2023-02-07 PROCEDURE — 3600000006 HC SURGERY LEVEL 6 BASE: Performed by: SURGERY

## 2023-02-07 PROCEDURE — 7100000010 HC PHASE II RECOVERY - FIRST 15 MIN: Performed by: SURGERY

## 2023-02-07 PROCEDURE — 7100000001 HC PACU RECOVERY - ADDTL 15 MIN: Performed by: SURGERY

## 2023-02-07 PROCEDURE — 7100000011 HC PHASE II RECOVERY - ADDTL 15 MIN: Performed by: SURGERY

## 2023-02-07 PROCEDURE — 38792 RA TRACER ID OF SENTINL NODE: CPT

## 2023-02-07 PROCEDURE — 6360000002 HC RX W HCPCS

## 2023-02-07 PROCEDURE — 2709999900 HC NON-CHARGEABLE SUPPLY: Performed by: SURGERY

## 2023-02-07 PROCEDURE — 2500000003 HC RX 250 WO HCPCS: Performed by: SURGERY

## 2023-02-07 PROCEDURE — 93005 ELECTROCARDIOGRAM TRACING: CPT | Performed by: ANESTHESIOLOGY

## 2023-02-07 PROCEDURE — 76098 X-RAY EXAM SURGICAL SPECIMEN: CPT

## 2023-02-07 PROCEDURE — 2500000003 HC RX 250 WO HCPCS

## 2023-02-07 PROCEDURE — 88342 IMHCHEM/IMCYTCHM 1ST ANTB: CPT

## 2023-02-07 PROCEDURE — 3700000000 HC ANESTHESIA ATTENDED CARE: Performed by: SURGERY

## 2023-02-07 PROCEDURE — 3600000016 HC SURGERY LEVEL 6 ADDTL 15MIN: Performed by: SURGERY

## 2023-02-07 PROCEDURE — 2580000003 HC RX 258: Performed by: SURGERY

## 2023-02-07 PROCEDURE — 2580000003 HC RX 258

## 2023-02-07 PROCEDURE — 3430000000 HC RX DIAGNOSTIC RADIOPHARMACEUTICAL: Performed by: RADIOLOGY

## 2023-02-07 PROCEDURE — 82962 GLUCOSE BLOOD TEST: CPT

## 2023-02-07 PROCEDURE — 3700000001 HC ADD 15 MINUTES (ANESTHESIA): Performed by: SURGERY

## 2023-02-07 PROCEDURE — A9520 TC99 TILMANOCEPT DIAG 0.5MCI: HCPCS | Performed by: RADIOLOGY

## 2023-02-07 RX ORDER — ONDANSETRON 2 MG/ML
4 INJECTION INTRAMUSCULAR; INTRAVENOUS
Status: DISCONTINUED | OUTPATIENT
Start: 2023-02-07 | End: 2023-02-07 | Stop reason: HOSPADM

## 2023-02-07 RX ORDER — METHYLENE BLUE 10 MG/ML
INJECTION INTRAVENOUS PRN
Status: DISCONTINUED | OUTPATIENT
Start: 2023-02-07 | End: 2023-02-07 | Stop reason: ALTCHOICE

## 2023-02-07 RX ORDER — LABETALOL HYDROCHLORIDE 5 MG/ML
5 INJECTION, SOLUTION INTRAVENOUS
Status: DISCONTINUED | OUTPATIENT
Start: 2023-02-07 | End: 2023-02-07

## 2023-02-07 RX ORDER — SODIUM CHLORIDE 9 MG/ML
INJECTION, SOLUTION INTRAVENOUS CONTINUOUS PRN
Status: DISCONTINUED | OUTPATIENT
Start: 2023-02-07 | End: 2023-02-07 | Stop reason: SDUPTHER

## 2023-02-07 RX ORDER — HYDRALAZINE HYDROCHLORIDE 20 MG/ML
5 INJECTION INTRAMUSCULAR; INTRAVENOUS
Status: DISCONTINUED | OUTPATIENT
Start: 2023-02-07 | End: 2023-02-07

## 2023-02-07 RX ORDER — OXYCODONE HYDROCHLORIDE AND ACETAMINOPHEN 5; 325 MG/1; MG/1
1 TABLET ORAL EVERY 6 HOURS PRN
Qty: 10 TABLET | Refills: 0 | Status: SHIPPED | OUTPATIENT
Start: 2023-02-07 | End: 2023-02-10

## 2023-02-07 RX ORDER — LIDOCAINE HYDROCHLORIDE 20 MG/ML
INJECTION, SOLUTION INTRAVENOUS PRN
Status: DISCONTINUED | OUTPATIENT
Start: 2023-02-07 | End: 2023-02-07 | Stop reason: SDUPTHER

## 2023-02-07 RX ORDER — DIPHENHYDRAMINE HYDROCHLORIDE 50 MG/ML
12.5 INJECTION INTRAMUSCULAR; INTRAVENOUS
Status: DISCONTINUED | OUTPATIENT
Start: 2023-02-07 | End: 2023-02-07

## 2023-02-07 RX ORDER — MIDAZOLAM HYDROCHLORIDE 2 MG/2ML
2 INJECTION, SOLUTION INTRAMUSCULAR; INTRAVENOUS
Status: DISCONTINUED | OUTPATIENT
Start: 2023-02-07 | End: 2023-02-07 | Stop reason: HOSPADM

## 2023-02-07 RX ORDER — IPRATROPIUM BROMIDE AND ALBUTEROL SULFATE 2.5; .5 MG/3ML; MG/3ML
1 SOLUTION RESPIRATORY (INHALATION)
Status: DISCONTINUED | OUTPATIENT
Start: 2023-02-07 | End: 2023-02-07 | Stop reason: HOSPADM

## 2023-02-07 RX ORDER — SODIUM CHLORIDE 0.9 % (FLUSH) 0.9 %
5-40 SYRINGE (ML) INJECTION EVERY 12 HOURS SCHEDULED
Status: DISCONTINUED | OUTPATIENT
Start: 2023-02-07 | End: 2023-02-07 | Stop reason: HOSPADM

## 2023-02-07 RX ORDER — SODIUM CHLORIDE 9 MG/ML
INJECTION INTRAVENOUS PRN
Status: DISCONTINUED | OUTPATIENT
Start: 2023-02-07 | End: 2023-02-07 | Stop reason: ALTCHOICE

## 2023-02-07 RX ORDER — DROPERIDOL 2.5 MG/ML
0.62 INJECTION, SOLUTION INTRAMUSCULAR; INTRAVENOUS
Status: DISCONTINUED | OUTPATIENT
Start: 2023-02-07 | End: 2023-02-07 | Stop reason: HOSPADM

## 2023-02-07 RX ORDER — ONDANSETRON 2 MG/ML
4 INJECTION INTRAMUSCULAR; INTRAVENOUS
Status: DISCONTINUED | OUTPATIENT
Start: 2023-02-07 | End: 2023-02-07

## 2023-02-07 RX ORDER — LABETALOL HYDROCHLORIDE 5 MG/ML
5 INJECTION, SOLUTION INTRAVENOUS
Status: DISCONTINUED | OUTPATIENT
Start: 2023-02-07 | End: 2023-02-07 | Stop reason: HOSPADM

## 2023-02-07 RX ORDER — SODIUM CHLORIDE 9 MG/ML
25 INJECTION, SOLUTION INTRAVENOUS PRN
Status: DISCONTINUED | OUTPATIENT
Start: 2023-02-07 | End: 2023-02-07 | Stop reason: HOSPADM

## 2023-02-07 RX ORDER — KETOROLAC TROMETHAMINE 30 MG/ML
INJECTION, SOLUTION INTRAMUSCULAR; INTRAVENOUS PRN
Status: DISCONTINUED | OUTPATIENT
Start: 2023-02-07 | End: 2023-02-07 | Stop reason: SDUPTHER

## 2023-02-07 RX ORDER — BUPIVACAINE HYDROCHLORIDE AND EPINEPHRINE 2.5; 5 MG/ML; UG/ML
INJECTION, SOLUTION EPIDURAL; INFILTRATION; INTRACAUDAL; PERINEURAL PRN
Status: DISCONTINUED | OUTPATIENT
Start: 2023-02-07 | End: 2023-02-07 | Stop reason: ALTCHOICE

## 2023-02-07 RX ORDER — MIDAZOLAM HYDROCHLORIDE 1 MG/ML
INJECTION INTRAMUSCULAR; INTRAVENOUS PRN
Status: DISCONTINUED | OUTPATIENT
Start: 2023-02-07 | End: 2023-02-07 | Stop reason: SDUPTHER

## 2023-02-07 RX ORDER — SODIUM CHLORIDE 0.9 % (FLUSH) 0.9 %
5-40 SYRINGE (ML) INJECTION PRN
Status: DISCONTINUED | OUTPATIENT
Start: 2023-02-07 | End: 2023-02-07 | Stop reason: HOSPADM

## 2023-02-07 RX ORDER — ONDANSETRON 2 MG/ML
INJECTION INTRAMUSCULAR; INTRAVENOUS PRN
Status: DISCONTINUED | OUTPATIENT
Start: 2023-02-07 | End: 2023-02-07 | Stop reason: SDUPTHER

## 2023-02-07 RX ORDER — HYDROCODONE BITARTRATE AND ACETAMINOPHEN 5; 325 MG/1; MG/1
1 TABLET ORAL
Status: COMPLETED | OUTPATIENT
Start: 2023-02-07 | End: 2023-02-07

## 2023-02-07 RX ORDER — PROPOFOL 10 MG/ML
INJECTION, EMULSION INTRAVENOUS CONTINUOUS PRN
Status: DISCONTINUED | OUTPATIENT
Start: 2023-02-07 | End: 2023-02-07 | Stop reason: SDUPTHER

## 2023-02-07 RX ORDER — FENTANYL CITRATE 50 UG/ML
INJECTION, SOLUTION INTRAMUSCULAR; INTRAVENOUS PRN
Status: DISCONTINUED | OUTPATIENT
Start: 2023-02-07 | End: 2023-02-07 | Stop reason: SDUPTHER

## 2023-02-07 RX ORDER — IPRATROPIUM BROMIDE AND ALBUTEROL SULFATE 2.5; .5 MG/3ML; MG/3ML
1 SOLUTION RESPIRATORY (INHALATION)
Status: DISCONTINUED | OUTPATIENT
Start: 2023-02-07 | End: 2023-02-07

## 2023-02-07 RX ORDER — ACETAMINOPHEN 325 MG/1
650 TABLET ORAL
Status: DISCONTINUED | OUTPATIENT
Start: 2023-02-07 | End: 2023-02-07 | Stop reason: HOSPADM

## 2023-02-07 RX ORDER — DIPHENHYDRAMINE HYDROCHLORIDE 50 MG/ML
12.5 INJECTION INTRAMUSCULAR; INTRAVENOUS
Status: DISCONTINUED | OUTPATIENT
Start: 2023-02-07 | End: 2023-02-07 | Stop reason: HOSPADM

## 2023-02-07 RX ORDER — ROCURONIUM BROMIDE 10 MG/ML
INJECTION, SOLUTION INTRAVENOUS PRN
Status: DISCONTINUED | OUTPATIENT
Start: 2023-02-07 | End: 2023-02-07 | Stop reason: SDUPTHER

## 2023-02-07 RX ORDER — HYDRALAZINE HYDROCHLORIDE 20 MG/ML
5 INJECTION INTRAMUSCULAR; INTRAVENOUS
Status: DISCONTINUED | OUTPATIENT
Start: 2023-02-07 | End: 2023-02-07 | Stop reason: HOSPADM

## 2023-02-07 RX ORDER — SODIUM CHLORIDE 9 MG/ML
INJECTION, SOLUTION INTRAVENOUS PRN
Status: DISCONTINUED | OUTPATIENT
Start: 2023-02-07 | End: 2023-02-07 | Stop reason: HOSPADM

## 2023-02-07 RX ORDER — DEXAMETHASONE SODIUM PHOSPHATE 10 MG/ML
INJECTION INTRAMUSCULAR; INTRAVENOUS PRN
Status: DISCONTINUED | OUTPATIENT
Start: 2023-02-07 | End: 2023-02-07 | Stop reason: SDUPTHER

## 2023-02-07 RX ORDER — DROPERIDOL 2.5 MG/ML
0.62 INJECTION, SOLUTION INTRAMUSCULAR; INTRAVENOUS
Status: DISCONTINUED | OUTPATIENT
Start: 2023-02-07 | End: 2023-02-07

## 2023-02-07 RX ORDER — SODIUM CHLORIDE, SODIUM LACTATE, POTASSIUM CHLORIDE, CALCIUM CHLORIDE 600; 310; 30; 20 MG/100ML; MG/100ML; MG/100ML; MG/100ML
INJECTION, SOLUTION INTRAVENOUS CONTINUOUS
Status: DISCONTINUED | OUTPATIENT
Start: 2023-02-07 | End: 2023-02-07 | Stop reason: HOSPADM

## 2023-02-07 RX ADMIN — SODIUM CHLORIDE 5 ML/HR: 9 INJECTION, SOLUTION INTRAVENOUS at 08:31

## 2023-02-07 RX ADMIN — LIDOCAINE HYDROCHLORIDE 40 MG: 20 INJECTION, SOLUTION INTRAVENOUS at 10:35

## 2023-02-07 RX ADMIN — CEFAZOLIN 2000 MG: 2 INJECTION, POWDER, FOR SOLUTION INTRAMUSCULAR; INTRAVENOUS at 10:31

## 2023-02-07 RX ADMIN — ROCURONIUM BROMIDE 10 MG: 50 INJECTION INTRAVENOUS at 10:35

## 2023-02-07 RX ADMIN — PROPOFOL 150 MG: 10 INJECTION, EMULSION INTRAVENOUS at 10:35

## 2023-02-07 RX ADMIN — MIDAZOLAM 2 MG: 1 INJECTION INTRAMUSCULAR; INTRAVENOUS at 10:23

## 2023-02-07 RX ADMIN — ONDANSETRON 4 MG: 2 INJECTION INTRAMUSCULAR; INTRAVENOUS at 12:21

## 2023-02-07 RX ADMIN — Medication 0.5 MG: at 13:28

## 2023-02-07 RX ADMIN — HYDROMORPHONE HYDROCHLORIDE 0.5 MG: 1 INJECTION, SOLUTION INTRAMUSCULAR; INTRAVENOUS; SUBCUTANEOUS at 13:28

## 2023-02-07 RX ADMIN — KETOROLAC TROMETHAMINE 30 MG: 30 INJECTION, SOLUTION INTRAMUSCULAR; INTRAVENOUS at 12:21

## 2023-02-07 RX ADMIN — FENTANYL CITRATE 50 MCG: 50 INJECTION, SOLUTION INTRAMUSCULAR; INTRAVENOUS at 10:49

## 2023-02-07 RX ADMIN — FENTANYL CITRATE 50 MCG: 50 INJECTION, SOLUTION INTRAMUSCULAR; INTRAVENOUS at 10:27

## 2023-02-07 RX ADMIN — HYDROCODONE BITARTRATE AND ACETAMINOPHEN 1 TABLET: 5; 325 TABLET ORAL at 15:04

## 2023-02-07 RX ADMIN — FENTANYL CITRATE 50 MCG: 50 INJECTION, SOLUTION INTRAMUSCULAR; INTRAVENOUS at 10:56

## 2023-02-07 RX ADMIN — FENTANYL CITRATE 50 MCG: 50 INJECTION, SOLUTION INTRAMUSCULAR; INTRAVENOUS at 12:50

## 2023-02-07 RX ADMIN — TILMANOCEPT 0.6 MILLICURIE: KIT at 10:33

## 2023-02-07 RX ADMIN — SODIUM CHLORIDE: 9 INJECTION, SOLUTION INTRAVENOUS at 10:23

## 2023-02-07 RX ADMIN — DEXAMETHASONE SODIUM PHOSPHATE 10 MG: 10 INJECTION INTRAMUSCULAR; INTRAVENOUS at 10:47

## 2023-02-07 RX ADMIN — PROPOFOL 100 MCG/KG/MIN: 10 INJECTION, EMULSION INTRAVENOUS at 10:27

## 2023-02-07 ASSESSMENT — PAIN DESCRIPTION - ORIENTATION
ORIENTATION: LEFT;ANTERIOR
ORIENTATION: LEFT

## 2023-02-07 ASSESSMENT — PAIN SCALES - GENERAL
PAINLEVEL_OUTOF10: 0
PAINLEVEL_OUTOF10: 6
PAINLEVEL_OUTOF10: 5
PAINLEVEL_OUTOF10: 8
PAINLEVEL_OUTOF10: 5
PAINLEVEL_OUTOF10: 7

## 2023-02-07 ASSESSMENT — PAIN - FUNCTIONAL ASSESSMENT: PAIN_FUNCTIONAL_ASSESSMENT: 0-10

## 2023-02-07 ASSESSMENT — PAIN DESCRIPTION - DESCRIPTORS
DESCRIPTORS: ACHING;DISCOMFORT

## 2023-02-07 ASSESSMENT — PAIN DESCRIPTION - LOCATION
LOCATION: CHEST
LOCATION: BREAST

## 2023-02-07 NOTE — DISCHARGE INSTRUCTIONS
[place] may be drowsy or lightheaded after receiving sedation or anesthesia. A responsible person should be with you for the next 24 hours. Please follow the instructions checked below:    OK to remove large surgical bandages in 48hrs with shower. Please leave steri-strips in place until they fall off. Please wear tight fitting bra day and night for 7 days    DIET INSTRUCTIONS:  [x]Start with light diet and progress to your normal diet as you feel like eating. If you experience nausea or repeated episodes of vomiting which persist beyond 12-24 hours, notify your doctor. []Other     ACTIVITY INSTRUCTIONS:  [x]Rest today. Increase activity as tolerated    []Elevate operative limb   [x]No heavy lifting or strenuous activity     [x]No driving while on pain medication  []Other     WOUND/DRESSING INSTRUCTIONS:  Always ensure you and your care giver clean hands before and after caring for the wound. [x]May shower 48 hrs from procedure   []May bathe      []Derma bond dressing-Do not apply lotion, gel, or liquid to wound while the derma bond is in place. []Other         MEDICATION INSTRUCTIONS:    [x]Prescriptions sent with you. Use as directed. When taking pain medications, you may experience dizziness or drowsiness. Do not drink alcohol or drive when taking these medications. []You may take a non-prescription headache remedy, preferably one that does not contain aspirin. Other Instructions:      FOLLOW-UP CARE:  [x]Call the office at [phone] for follow-up appointment as previously scheduled    Call physician if they or any other problems occur:  Fever over 101°    Redness, swelling, hardness or warmth at the operative site  Unrelieved nausea    Foul smelling or cloudy drainage at the operative site   Unrelieved pain    Blood soaked dressing.  (Some oozing may be normal)

## 2023-02-07 NOTE — ANESTHESIA POSTPROCEDURE EVALUATION
Department of Anesthesiology  Postprocedure Note    Patient: Brian Blum  MRN: [id number]  YOB: 1980  Date of evaluation: 2/7/2023      Procedure Summary     Date: 02/07/23 Room / Location: [address] [place] [address] 20 / [facility]    Anesthesia Start: 4336 Anesthesia Stop: 1253    Procedure: Left breast bracketed 2 mag seeds localized lumpectomy left axillary sentinel lymph node biopsy, (Left: Breast) Diagnosis:       Malignant neoplasm of left breast ([address]      (Malignant neoplasm of left breast ([address] [C50.912])    Surgeons: Kaylah Araiza MD Responsible Provider: Rach Mccall MD    Anesthesia Type: General ASA Status: 3          Anesthesia Type: General    Santiago Phase I: Santiago Score: 8    Santiago Phase II:        Anesthesia Post Evaluation    Patient location during evaluation: PACU  Patient participation: complete - patient participated  Level of consciousness: awake and alert  Airway patency: patent  Nausea & Vomiting: no nausea and no vomiting  Complications: no  Cardiovascular status: blood pressure returned to baseline and hemodynamically stable  Respiratory status: acceptable and spontaneous ventilation  Hydration status: euvolemic  Multimodal analgesia pain management approach

## 2023-02-07 NOTE — OP NOTE
Operative Note      Patient: Alejandrina Her  YOB: 1980  MRN: [id number]    Date of Procedure: 2/7/2023    Pre-Op Diagnosis: Malignant neoplasm of left breast ([address] [C50.912]    Post-Op Diagnosis: Same       Procedure(s):  Left breast bracketed 2 mag seeds localized lumpectomy left axillary sentinel lymph node biopsy,    Surgeon(s):  Ana Ferris MD    Assistant:   Resident: Lei Hoyos MD    Anesthesia: General    Estimated Blood Loss (mL): 74NW    Complications: None    Specimens:   ID Type Source Tests Collected by Time Destination   A : LEFT BREAST LUMPECTOMY - ANTERIOR SEED Tissue Tissue SURGICAL PATHOLOGY Ana Ferris MD 2/7/2023 1115    B : LEFT BREAST LUMPECTOMY-POSTERIOR SEED Tissue Tissue SURGICAL PATHOLOGY Ana Ferris MD 2/7/2023 1140    C : LEFT SENTINEL NODE #1 - BLUE #11 Tissue Tissue SURGICAL PATHOLOGY Ana Ferris MD 2/7/2023 1153    D : LEFT SENTINEL NODE #2 - BLUE [facility] Tissue Tissue SURGICAL PATHOLOGY Ana Ferris MD 2/7/2023 1201          Detailed Description of Procedure:   Procedure:  1. Intraoperative injection of the left breast for sentinel lymph node mapping  2.  2 site left mag seed localized lumpectomy  3. Left axillary sentinel lymph node biopsy    The patient was recently diagnosed with early stage left breast cancer. During the process of localization on the day prior to the procedures of additional calcifications were noted in the very anterior aspect of the same breast near the skin. These were reviewed with the radiologist and we decided to place a mag seed in this location as well for excision. In the preoperative holding area radionucleotide was injected in the left breast.  The patient was brought to the operating room placed supine on the operating table and given a general anesthetic. We began the procedure by injecting blue dye into the periareolar tissue.   The breast was massaged for 2 minutes and a sterile field was established with Hibiclens paint in the appropriate towels and drapes around the entire left torso. We began the operation by reviewing the localization films. We decided to perform a Vahid batwing incision and that the recently calcifications were just at the level of the skin. This Vahid batwing incision was made. A generous segment of tissue was isolated and removed and inked for orientation. Imaging this demonstrated the presence of calcifications. Through the same incision then we detected the presence of the second mag seed. Based on review of the films it did appear that the calcifications were located somewhat superior medial and anterior to the seed. Therefore we isolated and removed a generous segment of tissue in this location. The specimen was inked for margins and images well. At that point then we focused our attention into the left axilla. A small transverse skin incision was made below the hairbearing skin. We dissected through fatty tissue and eventually the clavipectoral fascia. We identified 2 sentinel nodes based on blue dye and radioactivity. At this point both surgical sites were irrigated and hemostasis was good. Local anesthesia was infiltrated into both surgical sites. Both the skin incisions were then closed with 3 of 4-0 Vicryl suture. Surgical glue and dressings were applied. The patient tolerated the procedure well.     Electronically signed by Kerry Cespedes MD on 2/7/2023 at 12:55 PM

## 2023-02-07 NOTE — ANESTHESIA PRE PROCEDURE
Department of Anesthesiology  Preprocedure Note       Name:  [facility]   Age:  36 y.o.  :  1980                                          MRN:  [id number]         Date:  2023      Surgeon: Teddy Freeman):  Zafar Rick MD    Procedure: Procedure(s):  Left breast bracketed 2 mag seeds localized lumpectomy left xillary sentinel lymph node biopsy, trident  - LMAC, trident / Mag seed representative    Medications prior to admission:   Prior to Admission medications    Medication Sig Start Date End Date Taking? Authorizing Provider   blood glucose monitor kit and supplies Dispense sufficient amount for indicated testing frequency plus additional to accommodate PRN testing needs. Dispense all needed supplies to include: monitor, strips, lancing device, lancets, control solutions, alcohol swabs.   Patient not taking: No sig reported 10/20/22   Eileen Hammans, MD   metFORMIN (GLUCOPHAGE) 1000 MG tablet Take 1 tablet by mouth 2 times daily (with meals) 10/20/22   Eileen Hammans, MD       Current medications:    Current Facility-Administered Medications   Medication Dose Route Frequency Provider Last Rate Last Admin    lactated ringers IV soln infusion   IntraVENous Continuous Zafar Rick MD        sodium chloride flush 0.9 % injection 5-40 mL  5-40 mL IntraVENous 2 times per day Zafar Rick MD        sodium chloride flush 0.9 % injection 5-40 mL  5-40 mL IntraVENous PRN Zafar Rick MD        0.9 % sodium chloride infusion   IntraVENous PRN Zafar Rick MD 5 mL/hr at 23 0831 5 mL/hr at 23 0831    ceFAZolin (ANCEF) 2,000 mg in sterile water 20 mL IV syringe  2,000 mg IntraVENous On Call to [facility] MD Wilfredo           Allergies:  No Known Allergies    Problem List:    Patient Active Problem List   Diagnosis Code    Other congenital or acquired abnormality of cervix, antepartum condition or complication V15.94    Sickle-cell trait ([facility] D57.3    Malignant neoplasm of left breast ([facility] C50.912       Past Medical History:        Diagnosis Date    Abnormal Pap smear     repeat was normal    Cancer ([facility] 2022    breast    Diabetes mellitus ([facility] 2014    Sickle cell trait ([facility]        Past Surgical History:        Procedure Laterality Date    BREAST BIOPSY       SECTION      LAPAROSCOPY      [facility] BREAST BIOPSY LEFT Left 2022    DONNELL STEROTACTIC LOC BREAST BIOPSY LEFT 2022 [facility]       Social History:    Social History     Tobacco Use    Smoking status: Never    Smokeless tobacco: Never   Substance Use Topics    Alcohol use: Yes     Comment: a bottle in one sitting on the weekends                                Counseling given: Not Answered      Vital Signs (Current):   Vitals:    23 1542 23 0820   BP:  108/69   Pulse:  (!) 112   Resp:  18   Temp:  98 °F (36.7 °C)   TempSrc:  Temporal   SpO2:  100%   Weight: 113 lb (51.3 kg) 113 lb (51.3 kg)   Height: 5' 1\" (1.549 m) 5' 1\" (1.549 m)                                              BP Readings from Last 3 Encounters:   23 108/69   23 96/68   22 104/60       NPO Status: Time of last liquid consumption:                         Time of last solid consumption:                         Date of last liquid consumption: 23                        Date of last solid food consumption: 23    BMI:   Wt Readings from Last 3 Encounters:   23 113 lb (51.3 kg)   23 116 lb (52.6 kg)   22 112 lb 11.2 oz (51.1 kg)     Body mass index is 21.35 kg/m².     CBC:   Lab Results   Component Value Date/Time    WBC 8.7 2022 01:57 PM    RBC 3.96 2022 01:57 PM    HGB 10.6 2022 01:57 PM    HCT 32.0 2022 01:57 PM    MCV 80.8 2022 01:57 PM    RDW 16.9 2022 01:57 PM     2022 01:57 PM       CMP:   Lab Results   Component Value Date/Time     2022 01:57 PM    K 3.6 2022 01:57 PM    K 3.3 07/02/2021 08:56 PM     11/23/2022 01:57 PM    CO2 24 11/23/2022 01:57 PM    BUN 8 11/23/2022 01:57 PM    CREATININE 0.6 11/23/2022 01:57 PM    GFRAA >60 07/02/2021 08:56 PM    LABGLOM >60 11/23/2022 01:57 PM    GLUCOSE 102 11/23/2022 01:57 PM    PROT 8.3 11/23/2022 01:57 PM    CALCIUM 9.6 11/23/2022 01:57 PM    BILITOT 1.3 11/23/2022 01:57 PM    ALKPHOS 65 11/23/2022 01:57 PM    AST 11 11/23/2022 01:57 PM    ALT 6 11/23/2022 01:57 PM       POC Tests: No results for input(s): POCGLU, POCNA, POCK, POCCL, POCBUN, POCHEMO, POCHCT in the last 72 hours. Coags: No results found for: PROTIME, INR, APTT    HCG (If Applicable): No results found for: PREGTESTUR, PREGSERUM, HCG, HCGQUANT     ABGs: No results found for: PHART, PO2ART, PHT4VAS, GSU1NEH, BEART, V4KATZUE     Type & Screen (If Applicable):  No results found for: LABABO, LABRH    Drug/Infectious Status (If Applicable):  No results found for: HIV, HEPCAB    COVID-19 Screening (If Applicable): No results found for: COVID19        Anesthesia Evaluation  Patient summary reviewed and Nursing notes reviewed no history of anesthetic complications:   Airway: Mallampati: II  TM distance: >3 FB   Neck ROM: full  Mouth opening: > = 3 FB   Dental:      Comment: Multiple missing    Pulmonary:Negative Pulmonary ROS   (+) decreased breath sounds                            Cardiovascular:Negative CV ROS  Exercise tolerance: good (>4 METS),         ECG reviewed  Rhythm: regular  Rate: normal                    Neuro/Psych:   Negative Neuro/Psych ROS              GI/Hepatic/Renal: Neg GI/Hepatic/Renal ROS            Endo/Other:    (+) DiabetesType II DM, well controlled, , blood dyscrasia: anemia:., .                  ROS comment: Sickle cell trait Abdominal:             Vascular: negative vascular ROS. Other Findings:           Anesthesia Plan      general     ASA 3       Induction: intravenous.       Anesthetic plan and risks discussed with patient. DOS STAFF ADDENDUM:    Patient seen and chart reviewed. Physical exam and history updated as indicated. NPO status confirmed. Anesthesia options and plan discussed including risks benefits with patient/legal guardian and family as available. Concerns and questions addressed. Consent verbalized to proceed.   Anesthesia plan, options and intraoperative/postoperative concerns discussed with care team.    Roger Bills MD, MD  2/7/2023  8:50 AM        Roger Bills MD   2/7/2023

## 2023-02-07 NOTE — INTERVAL H&P NOTE
Update History & Physical    The patient's History and Physical was reviewed with the patient and I examined the patient. There was no change in plan. The surgical site was confirmed by the patient and me. Plan: The risks, benefits, expected outcome, and alternative to the recommended procedure have been discussed with the patient. Patient understands and wants to proceed with the procedure.      Electronically signed by Lala Brwon MD on 2/7/2023 at 10:21 AM

## 2023-02-07 NOTE — PROGRESS NOTES
CLINICAL PHARMACY NOTE: MEDS TO BEDS    Total # of Prescriptions Filled: 1   The following medications were delivered to the patient:  Percocet 5-325 mg    Additional Documentation:     Delivered to PACU Alondra RN @2:27

## 2023-02-08 RX ORDER — ONDANSETRON 4 MG/1
4 TABLET, FILM COATED ORAL 3 TIMES DAILY PRN
Qty: 15 TABLET | Refills: 0 | Status: SHIPPED | OUTPATIENT
Start: 2023-02-08

## 2023-02-22 ENCOUNTER — OFFICE VISIT (OUTPATIENT)
Dept: BREAST CENTER | Age: 43
End: 2023-02-22

## 2023-02-22 VITALS
DIASTOLIC BLOOD PRESSURE: 64 MMHG | HEIGHT: 61 IN | BODY MASS INDEX: 20.2 KG/M2 | WEIGHT: 107 LBS | SYSTOLIC BLOOD PRESSURE: 118 MMHG | RESPIRATION RATE: 12 BRPM | TEMPERATURE: 97.8 F

## 2023-02-22 DIAGNOSIS — Z85.3 PERSONAL HISTORY OF BREAST CANCER: Primary | ICD-10-CM

## 2023-02-22 DIAGNOSIS — C50.912 MALIGNANT NEOPLASM OF LEFT FEMALE BREAST, UNSPECIFIED ESTROGEN RECEPTOR STATUS, UNSPECIFIED SITE OF BREAST (HCC): Primary | ICD-10-CM

## 2023-02-22 PROCEDURE — 99024 POSTOP FOLLOW-UP VISIT: CPT | Performed by: SURGERY

## 2023-02-22 NOTE — PROGRESS NOTES
Date of visit: 2/22/2023 11/23/22 02/22/23      DIAGNOSIS:  1. (11/23/22) LEFT (2:00) invasive ductal carcinoma  Clinical stage: T1a(2mm)N0M0  ER (95) IA (95) HER2 (0) Ki67 (5)  2. Sickle cell trait    TREATMENT  1. (02/07/23) LEFT lumpectomy and SLNB    IMAGING/PROCEDURES:  1. (08/11/22) BILATERAL d-mammogram ([place]): BIRADS-4  * LEFT breast calcifications  * Preop for augmentation  2. (11/09/22) LEFT stereotactic biopsy  *dk 1.7 x 1.7cm  3. (01/11/22) MRI: BIRADS-6  * LEFT (2:00) 2.4cm of enhancement at biopsy site    Breast History  Chris Darden was in the office today for a postoperative visit. Krystle recently underwent a left breast lumpectomy and sentinel lymph node biopsy for breast cancer. Breast Symptoms  Krystle has some ongoing discomfort but nothing unexpected. She has no other significant symptoms to report. Breast Examination  The incision in the breast and axilla are healing nicely with no evidence of infection hematoma or seroma. Pathology  I discussed the surgical pathology report with Krystle. There was no invasive cancer remaining in the surgical specimen. Of note 2 mm of invasive cancer was noted on her image guided biopsy. The surgical margins were negative and there were no metastases in the sentinel nodes. Assessment/Plan  Chris Darden has completed the surgical management of her left breast cancer. I informed Jeanine Wade that we will make referrals to medical oncology and radiation oncology to discuss adjuvant recommendations. If all is well she will be due for imaging in August and see us at that time. He certainly asked him to contact us with any concerns prior to her next scheduled visit. This note was created with voice recognition software. Please excuse any grammatical errors that were not corrected and please contact me if there are any questions. [email or web address]  [phone]

## 2023-02-24 ENCOUNTER — HOSPITAL ENCOUNTER (OUTPATIENT)
Dept: RADIATION ONCOLOGY | Age: 43
Discharge: HOME | End: 2023-02-24

## 2023-02-27 ENCOUNTER — TELEMEDICINE (OUTPATIENT)
Dept: INTERNAL MEDICINE | Age: 43
End: 2023-02-27
Payer: MEDICAID

## 2023-02-27 DIAGNOSIS — F41.9 ANXIETY: Primary | ICD-10-CM

## 2023-02-27 PROCEDURE — 90791 PSYCH DIAGNOSTIC EVALUATION: CPT | Performed by: SOCIAL WORKER

## 2023-02-28 ASSESSMENT — ANXIETY QUESTIONNAIRES
5. BEING SO RESTLESS THAT IT IS HARD TO SIT STILL: 0
2. NOT BEING ABLE TO STOP OR CONTROL WORRYING: 1
4. TROUBLE RELAXING: 1
3. WORRYING TOO MUCH ABOUT DIFFERENT THINGS: 2
GAD7 TOTAL SCORE: 8
6. BECOMING EASILY ANNOYED OR IRRITABLE: 2
7. FEELING AFRAID AS IF SOMETHING AWFUL MIGHT HAPPEN: 1
1. FEELING NERVOUS, ANXIOUS, OR ON EDGE: 1

## 2023-02-28 NOTE — PROGRESS NOTES
TeleMedicine Patient Consent     This visit was performed as a virtual video visit using a synchronous, two-way, audio-video telehealth technology platform. Patient identification was verified at the start of the visit, including the patient's telephone number and physical location. I discussed with the patient the nature of our telehealth visits, that:      Due to the nature of an audio- video modality, the only components of a physical exam that could be done are the elements supported by direct observation. The provider will evaluate the patient and recommend diagnostics and treatments based on their assessment. If it was felt that the patient should be evaluated in clinic or an emergency room setting, then they would be directed there. Our sessions are not being recorded and that personal health information is protected. Our team would provide follow up care in person if/when the patient needs it. Patient does agree to proceed with telemedicine consultation. Patient location: home address in [facility]    Physician location: regular office location    This visit was completed virtually using Doxy. me     This visit was performed during the 7466 public health crisis and COVID-19 pandemic. [facility] DERREK, GEOFFREY    Visit Date: 2/27/2023   Time of appointment:  10:10   Time spent with Patient: 30 minutes. This is patient's first appointment. Reason for Consult:  Anxiety     Referring Provider/PCP:    No ref. provider found  Mayo Xiao MD      Pt provided informed consent for the behavioral health program. Discussed with patient model of service to include the limits of confidentiality (i.e. abuse reporting, suicide intervention, etc.) and short-term intervention focused approach. PRESENTING PROBLEM AND HISTORY  Maxine Eduardo is a 36 y.o. female who presents for new evaluation and treatment of anxiety.   She has the following symptoms: feeling nervous, anxious, or on edge. Onset of symptoms was approximately several months ago. Symptoms have been stable since that time. She denies current suicidal and homicidal ideation. Family history significant for depression and substance abuse. Risk factors: previous episode of depression. Pt reports a hx of being diagnosed with Bipolar Disorder, pt reports hospitalization in her early 35s. Pt reports since then she anna on her own and has not been on any medication. Pt reports since her diagnosis of breast cancer this has increased her anxiety symptoms greatly. LISW provided phone number to [facility] for a medication consult due to hx of Bipolar Disorder. MENTAL STATUS EXAM  Mood was euthymic with congruent affect. Suicidal ideation was denied. Homicidal ideation was denied. Hygiene was good . Dress was neat. Behavior was Within Normal Limits with No self-report of difficulties ambulating. Attitude was Cooperative, [place], and Help-seeking. Eye-contact was good. Speech: rate - WNL, rhythm -  WNL, volume - WNL  Verbalizations were goal directed. Thought processes were intact and goal-oriented without evidence of delusions, hallucinations, obsessions, or krystle; with little cognitive distortions. Associations were characterized by intact cognitive processes. Pt was oriented to person, place, time, and general circumstances;  recent:  good. Insight and judgment were estimated to be good, AEB, a good  understanding of cyclical maladaptive patterns, and the ability to use insight to inform behavior change. CURRENT MEDICATIONS    Current Outpatient Medications:     ondansetron (ZOFRAN) 4 MG tablet, Take 1 tablet by mouth 3 times daily as needed for Nausea or Vomiting, Disp: 15 tablet, Rfl: 0    blood glucose monitor kit and supplies, Dispense sufficient amount for indicated testing frequency plus additional to accommodate PRN testing needs.  Dispense all needed supplies to include: monitor, strips, lancing device, lancets, control solutions, alcohol swabs., Disp: 1 kit, Rfl: 0    metFORMIN (GLUCOPHAGE) 1000 MG tablet, Take 1 tablet by mouth 2 times daily (with meals), Disp: 60 tablet, Rfl: 3     FAMILY MEDICAL/MH HISTORY   Her family history includes Heart Failure in her father. PATIENT MENTAL HEALTH HISTORY  Pt reports a hx of Bipolar Disorder pt reports manic symptoms would include spending and changing employment    PSYCHOSOCIAL HISTORY  Current living situation: Pt lives with her boyfriend and 4 children     Work/Education: Pt works as an STNA at [employer] system: Pt identifies positive support system        [address] [place] [address] [place]:  She reports that she has never smoked. She has never used smokeless tobacco.  ALCOHOL:  She reports current alcohol use. OTHER SUBSTANCES: She reports that she does not currently use drugs. ASSESSMENT  Irvin Rodriguez presented to the appointment today for evaluation and treatment of symptoms of anxiety. She is currently deemed no risk to herself or others and meets criteria for anxiety. She will benefit from a medication evaluation to assess if anxiety medications could be helpful in treating anxiety symptoms. Krystle symptoms are well controlled at this time. She will also benefit from brief and solution-focused consultation to address cognitive and behavioral interventions for anxiety symptoms. Krystle was in agreement with recommendations. Pt reports since being diagnosed with breast cancer she feels overwhelmed with unknowns. Pt reports she would like some support as she navigates next steps. GEOFFREY validated current emotions and provided support and education on past behavioral health diagnosis. Pt was open and help seeking throughout the assessment process.        PHQ Scores 2/1/2023 10/20/2022   PHQ2 Score 2 6   PHQ9 Score 2 24     Interpretation of Total Score Depression Severity: 1-4 = Minimal depression, 5-9 = Mild depression, 10-14 = Moderate depression, 15-19 = Moderately severe depression, 20-27 = Severe depression    How often pt has had thoughts of death or hurting self (if PHQ positive for depression):       BUDDY 7 SCORE 2/28/2023   BUDDY-7 Total Score 8     Interpretation of BUDDY-7 score: 5-9 = mild anxiety, 10-14 = moderate anxiety, 15+ = severe anxiety. Recommend referral to behavioral health for scores 10 or greater. DIAGNOSIS  Krystle was seen today for anxiety. Diagnoses and all orders for this visit:    Anxiety        INTERVENTION  Provided education, Discussed self-care (sleep, nutrition, rewarding activities, social support, exercise), Established rapport, [place]-setting to identify pt's primary goals for [facility] visit / overall health, Supportive techniques, and Reviewed options for identifying appropriate providers      PLAN  GEOFFREY to follow up with pt in one week  Pt to contact [facility] for medication appointment      INTERACTIVE COMPLEXITY  Is interactive complexity present?   No  Reason:  N/A  Additional Supporting Information:  N/A       Electronically signed by GEOFFREY Gaston on 2/28/23 at 9:43 AM EST

## 2023-03-03 ENCOUNTER — HOSPITAL ENCOUNTER (OUTPATIENT)
Dept: RADIATION ONCOLOGY | Age: 43
Discharge: HOME | End: 2023-03-03
Payer: MEDICAID

## 2023-03-03 ENCOUNTER — TELEPHONE (OUTPATIENT)
Dept: CASE MANAGEMENT | Age: 43
End: 2023-03-03

## 2023-03-03 VITALS
WEIGHT: 113.3 LBS | DIASTOLIC BLOOD PRESSURE: 62 MMHG | SYSTOLIC BLOOD PRESSURE: 104 MMHG | TEMPERATURE: 97.2 F | BODY MASS INDEX: 21.41 KG/M2 | OXYGEN SATURATION: 98 % | HEART RATE: 79 BPM | RESPIRATION RATE: 18 BRPM

## 2023-03-03 DIAGNOSIS — C50.919 MALIGNANT NEOPLASM OF FEMALE BREAST, UNSPECIFIED ESTROGEN RECEPTOR STATUS, UNSPECIFIED LATERALITY, UNSPECIFIED SITE OF BREAST (HCC): Primary | ICD-10-CM

## 2023-03-03 PROCEDURE — 99245 OFF/OP CONSLTJ NEW/EST HI 55: CPT | Performed by: RADIOLOGY

## 2023-03-03 PROCEDURE — 99205 OFFICE O/P NEW HI 60 MIN: CPT

## 2023-03-03 NOTE — PROGRESS NOTES
Jayant Jackson  1980 36 y.o. Referring Physician: Dr. Eduardo Nur    PCP: Concetta Morris MD     Vitals:    23 0926   BP: 104/62   Pulse: 79   Resp: 18   Temp: 97.2 °F (36.2 °C)   SpO2: 98%        Wt Readings from Last 3 Encounters:   23 113 lb 4.8 oz (51.4 kg)   23 107 lb (48.5 kg)   23 113 lb (51.3 kg)        Body mass index is 21.41 kg/m². Chief Complaint: No chief complaint on file. Cancer Staging  Malignant neoplasm of left breast [facility])  Staging form: Breast, AJCC 8th Edition  - Clinical stage from 2023: Stage IA (cT1a, cN0, cM0, G1, ER+, UT+, HER2-) - Signed by Misty Shelby MD on 2023      Prior Radiation Therapy? NO    Concurrent Chemo/radiation? NO    Prior Chemotherapy? NO    Prior Hormonal Therapy? NO    Head and Neck Cancer? No, patient does NOT have HN cancer. LMP: 2023    Age at first Menses: Ajith Kumar    : 4    Para: 4        Current Outpatient Medications   Medication Sig Dispense Refill    ondansetron (ZOFRAN) 4 MG tablet Take 1 tablet by mouth 3 times daily as needed for Nausea or Vomiting 15 tablet 0    blood glucose monitor kit and supplies Dispense sufficient amount for indicated testing frequency plus additional to accommodate PRN testing needs. Dispense all needed supplies to include: monitor, strips, lancing device, lancets, control solutions, alcohol swabs. 1 kit 0    metFORMIN (GLUCOPHAGE) 1000 MG tablet Take 1 tablet by mouth 2 times daily (with meals) 60 tablet 3     No current facility-administered medications for this encounter.        Past Medical History:   Diagnosis Date    Abnormal Pap smear     repeat was normal    Cancer ([facility] [address] 2022    breast    Diabetes mellitus ([facility] [address] 2014    Sickle cell trait [facility])        Past Surgical History:   Procedure Laterality Date    BREAST BIOPSY      BREAST BIOPSY Left 2023    Left breast bracketed 2 mag seeds localized lumpectomy left axillary sentinel lymph node biopsy, performed by Marycruz Villegas MD at [address]      [facility] BREAST BIOPSY LEFT Left 11/9/2022    [facility] BREAST BIOPSY LEFT 11/9/2022 [facility]       Family History   Problem Relation Age of Onset    Heart Failure Father        Social History     Socioeconomic History    Marital status: Legally      Spouse name: Not on file    Number of children: Not on file    Years of education: Not on file    Highest education level: Not on file   Occupational History    Not on file   Tobacco Use    Smoking status: Never    Smokeless tobacco: Never   Vaping Use    Vaping Use: Never used   Substance and Sexual Activity    Alcohol use: Yes     Comment: a bottle in one sitting on the weekends    Drug use: Not Currently     Comment: previously smoked marijuana    Sexual activity: Not on file   Other Topics Concern    Not on file   Social History Narrative    Not on file     Social Determinants of Health     Financial Resource Strain: Low Risk     Difficulty of Paying Living Expenses: Not hard at all   Food Insecurity: Food Insecurity Present    Worried About [address] in the Last Year: Sometimes true    Ran Out of Food in the Last Year: Sometimes true   Transportation Needs: Unknown    Lack of Transportation (Medical): Not on file    Lack of Transportation (Non-Medical): No   Physical Activity: Not on file   Stress: Not on file   Social Connections: Not on file   Intimate Partner Violence: Not on file   Housing Stability: Unknown    Unable to Pay for Housing in the Last Year: Not on file    Number of Places Lived in the Last Year: Not on file    Unstable Housing in the Last Year: No           Occupation: [employer]  Retired:  NO        REVIEW OF SYSTEMS: <<For Level 5, 10 or more systems>> approximately >20mins spent with patient about radiation therapy to the left breast utilizing handouts and slides.  She had an abnormal mammogram 8/11/22 that presented left breast loose grouping of pleomorphic microcalcification's within the mid to posterior parenchyma of the upper outer left breast. She then went to [facility] to see about implants. She returned in [facility] and had a biopsy of the left breast 11/9/22 that presented invasive ductal carcinoma, DCIS present ER/LA+ Her2-, K1 67=5% at the 2:00 position of the left breast. She had surgery 2/7/2023 by Dr. Sohan Kolb for a left lumpectomy with sentinel lymph node biopsy, [id number])  They were unable to send an onco type on the patient because the specimen was not large enough. She is still having to see medical oncology for consult. All questions were answered from a nursing perspective and she expressed understanding of care. Pacemaker/Defibulator/ICD:  No    Mediport: No        FALLS RISK SCREENING ASSESSMENT    Instructions:  Assess the patient and enter the appropriate indicators that are present for fall risk identification. Total the numbers entered and assign a fall risk score from Table 2.  Reassess patient at a minimum every 12 weeks or with status change. Assessment   Date  3/3/2023     1. Mental Ability: confusion/cognitively impaired No - 0       2. Elimination Issues: incontinence, frequency No - 0       3. Ambulatory: use of assistive devices (walker, cane, off-loading devices), attached to equipment (IV pole, oxygen) No - 0     4. Sensory Limitations: dizziness, vertigo, impaired vision No - 0       5. Age Less than 65 years - 0       6. Medication: diuretics, strong analgesics, hypnotics, sedatives, antihypertensive agents   No - 0   7. Falls:  recent history of falls within the last 3 months (not to include slipping or tripping)   No - 0   TOTAL 0    If score of 4 or greater was education given? No       TABLE 2   Risk Score Risk Level Plan of Care   0-3 Little or  No Risk 1. Provide assistance as indicated for ambulation activities  2.   Reorient confused/cognitively impaired patient  3. Call-light/bell within patient's reach  4. Chair/bed in low position, stretcher/bed with siderails up except when performing patient care activities  5. Educate patient/family/caregiver on falls prevention  6.  Reassess in 12 weeks or with any noted change in patient condition which places them at a risk for a fall   4-6 Moderate Risk 1. Provide assistance as indicated for ambulation activities  2. Reorient confused/cognitively impaired patient  3. Call-light/bell within patient's reach  4. Chair/bed in low position, stretcher/bed with siderails up except when performing patient care activities  5. Educate patient/family/caregiver on falls prevention  6. Falls risk precaution (Yellow sticker Level II) placed on patient chart   7 or   Higher High Risk 1. Place patient in easily observable treatment room  2. Patient attended at all times by family member or staff  3. Provide assistance as indicated for ambulation activities  4. Reorient confused/cognitively impaired patient  5. Call-light/bell within patient's reach  6. Chair/bed in low position, stretcher/bed with siderails up except when performing patient care activities  7. Educate patient/family/caregiver on falls prevention  8. Falls risk precaution (Yellow sticker Level III) placed on patient chart           MALNUTRITION RISK SCREENING ASSESSMENT    Instructions:  Assess the patient and enter the appropriate indicators that are present for nutrition risk identification. Total the numbers entered and assign a risk score. Follow the appropriate action for total score listed below. Assessment   Date  3/3/2023     Have you lost weight without trying? 0- No     Have you been eating poorly because of a decreased appetite? 0- No   3. Do you have a diagnosis of head and neck cancer?       0- No                                                                                    TOTAL 0          Score of 0-1: No action  Score 2 or greater: For Non-Diabetic Patient: Recommend adding Ensure Complete 2 x daily and provide patient with Ensure wellness bag with coupons  For Diabetic Patient: Recommend adding Glucerna Shake 2 x daily and provide patient with Glucerna Wellness bag with coupons  Route to the dietitian via [address]    Are you having difficulty performing daily routine tasks due to fatigue or weakness (ie: bathing/showering, dressing, housework, meal prep, work, , etc): No     Do you have any arm flexibility/ROM restrictions, swelling or pain that limit activity: No     Any changes in memory, attention/focus that impact daily activities: No     Do you avoid participation in leisure/social activity due to weakness, fatigue or pain: No     ARE ANY OF THE ABOVE ARE ANSWERED YES: No          PT ASSESSMENT FOR REFERRAL    Have you had any recent falls in the past 2 months: No     Do you have difficulty going up/down stairs: No     Are you having difficulty walking: No     Do you often hold onto furniture/environmental supports or feel off balance when you are walking: No     Do you need to take rest breaks when you are walking: No     Any pain on a scale of 1-10 that limits your mobility: No 0/10    ARE ANY OF THE ABOVE ARE ANSWERED YES: No           LYMPHEDEMA SCREENING ASSESSMENT FOR PATIENTS WITH BREAST CANCER    The patient reports the following signs/symptoms of lymphedema: None    Please ask the provider to assess patient for lymphedema for any reported signs or symptoms so a referral to Lymphedema Therapy can be considered. PELVIC FLOOR DYSFUNCTION SCREENING ASSESSMENT    - Do you have any pelvic pain? No     - Do you have any fecal constipation or fecal incontinence? No     - Do you have any urinary incontinence or difficulty starting to urinate?  No     ARE ANY OF THE ABOVE ARE ANSWERED YES: No          PREHAB AUDIOLOGY REFERRAL    - Is patient planned to receive Cisplatin? No. This patient is not planned to start Cisplatin. - Is patient planned to receive radiation therapy that may be directed toward auditory canals or nerves? No. Patient is not planned to start radiation therapy to auditory canals or nerves. - Is patient complaining of new onset hearing loss? No. Patient is not complaining of new onset hearing loss. Patient education given on radiation therapy to the left breast. The patient expresses understanding and acceptance of instructions.  Sania Tate RN 3/3/2023 9:28 AM           Sania Tate RN

## 2023-03-03 NOTE — PROGRESS NOTES
Radiation Oncology        Russ Garcia. Lesvia Sharif  [facility]        Referring Physician: Dr. Silvana Dozier      Primary Care Physician:Susan Monte MD   Primary Oncologist: [facility] [address]      Diagnosis: pT1a pNo(sn) cMo left breast cancer s/p BCS   -ER+   -MT+   -HER2-   -Ki67 5%   -Gr 1      Service:  Radiation Oncology consultation performed on 3/3/23        HPI:        ***      -----    Per [address]:      -pending      -----    Pathology reviewed:      BCS 2/7/23:    Diagnosis:   A. Left breast, anterior with seed, lumpectomy:    - Ductal carcinoma in situ, intermediate nuclear grade,   cribriform/papillary type;    - Cancerization of lobules by DCIS;    - Fibrocystic changes with apocrine metaplasia, sclerosing lobular   hyperplasia, microcysts and             stromal fibrosis/hyalinosis:             - Microcalcifications in DCIS;    - Magseed and focal fresh hemorrhage, consistent with changes of biopsy   site;    - Dermal scar, see comment. B. Left breast, posterior with seed, lumpectomy:    - Ductal carcinoma in situ, intermediate nuclear grade, cribriform and   micropapillary types;    - Atypical ductal hyperplasia also present;    - Fibrocystic changes with sclerosing adenosis, papillomatosis,   sclerosing lobular hyperplasia,             apocrine metaplasia, microcysts and dense stromal        fibrosis/hyalinosis;    - Microcalcifications in DCIS and benign ducts;    - Clip material with scarring, foreign body-type giant cell reaction,   aggregates of hemosiderin             macrophages, consistent with changes of previous needle biopsy        site, see comment. C.  Left sentinel lymph node #1, biopsy: One (1) benign node showing   prominent fatty replacement. D.  Left sentinel lymph node #2, biopsy: One (1) benign lymph node with   reactive changes.      Comment:   In specimen B, the prior core needle biopsy site with clip was   identified on gross examination. Extensive sections of the breast tissue   surrounding the biopsy site show no histologic evidence of residual   invasive carcinoma. It is likely that a small focus (2 mm) of invasive   carcinoma has been completely removed by prior core needle biopsy on   11/09/2022 (see report: XYJ-). The absence of residual invasive   carcinoma is further confirmed by immunostaining for p63 on sections of   the tissue blocks with DCIS from specimen A (A4, A5 and A6) and specimen   B (B3 and B5) showing presence of a positively stained myoepithelial   layer at the periphery along the atypical glands. The DCIS/cancerization of lobules and atypical ductal hyperplasia seen in   the specimens A and B are confirmed by negative immunostaining for CK5/6   on sections of tissue blocks A4/A5/A6 and B3/B5. Information regarding the invasive carcinoma in the cancer case summary   is from report DZO-. The closest distance between the resection   margin and invasive carcinoma is the distance between the center of prior   biopsy scar and the inked margins measured on slide B5.       CANCER CASE SUMMARY (combined information from specimens A, B and report   AEG-)   Procedure: Excision   Specimen Laterality: Left   Tumor Site: 2:00   Histologic Type:  Invasive Carcinoma of no special type (ductal)   Histologic Grade: [place] Histologic Score        Glandular/tubular differentiation: Score 1        Nuclear pleomorphism: Score 2        Mitotic rate: Score 1        Overall grade: G1 (scores of 4)   Tumor Size: 2 mm   Tumor Focality: Single focus   Ductal Carcinoma In Situ (DCIS): Present        Negative for extensive intraductal component        Size of DCIS: Cannot be determined (a few small foci)        Architectural patterns: Cribriform/papillary and micropapillary        Nuclear grade: Grade 2 (intermediate)        Necrosis: Not identified        Number of blocks with DCIS: 5        Number of blocks examined: 18        Lobular Carcinoma In Situ (LCIS): Not identified   Lymphatic and/or vascular invasion: Not identified   Microcalcifications: Present in DCIS   Treatment effect in the breast/lymph nodes: No known presurgical therapy   Margins:   Margin status for invasive carcinoma:         All margins negative for invasive carcinoma        Distance from invasive carcinoma to closest margin: 8 mm (see   comment)        Closest margin to invasive carcinoma: Posterior   Margin status for DCIS:        All margins negative for DCIS        Distance from DCIS to closest margin in specimen A: < 1 mm        Closest margin to DCIS: Superior (red ink, slide A5)        Distance from DCIS to closest margin in specimen B: 4 mm        Closest margin to DCIS: Posterior black ink, slide B5        Regional Lymph Nodes        Regional lymph nodes present        All regional lymph nodes negative for tumor        Total number of lymph nodes examined: 2        Number of sentinel nodes examined: 2        pTNM classification (AJCC 8th Edition)        pT category: pT1a (tumor >1 mm but </=5 mm in greatest dimension)        pN category: pN0 (no regional lymph node metastasis identified)   N Suffix: (sn)-[place] nodes (<6) evaluated        pN0 (sn)        Ancillary Studies: ER(+)/HI(+)/HER2((-)/Ki-67: <5% (for details,   please see report RAY-)       -----      Past Medical History:   Diagnosis Date    Abnormal Pap smear     repeat was normal    Cancer ([facility] 11/2022    breast    Diabetes mellitus ([facility] [facility] 01/01/2014    Sickle cell trait ([facility]        Past Surgical History:   Procedure Laterality Date    BREAST BIOPSY      BREAST BIOPSY Left 2/7/2023    Left breast bracketed 2 mag seeds localized lumpectomy left axillary sentinel lymph node biopsy, performed by Huy Schaffer MD at [address] [place] [address] BREAST BIOPSY LEFT Left 11/9/2022    [facility] BREAST BIOPSY LEFT 11/9/2022 [facility]       Family History   Problem Relation Age of Onset    Heart Failure Father        Current Outpatient Medications   Medication Sig Dispense Refill    ondansetron (ZOFRAN) 4 MG tablet Take 1 tablet by mouth 3 times daily as needed for Nausea or Vomiting 15 tablet 0    blood glucose monitor kit and supplies Dispense sufficient amount for indicated testing frequency plus additional to accommodate PRN testing needs. Dispense all needed supplies to include: monitor, strips, lancing device, lancets, control solutions, alcohol swabs. 1 kit 0    metFORMIN (GLUCOPHAGE) 1000 MG tablet Take 1 tablet by mouth 2 times daily (with meals) 60 tablet 3     No current facility-administered medications for this encounter.        No Known Allergies    Social History     Socioeconomic History    Marital status: Legally      Spouse name: None    Number of children: None    Years of education: None    Highest education level: None   Tobacco Use    Smoking status: Never    Smokeless tobacco: Never   Vaping Use    Vaping Use: Never used   Substance and Sexual Activity    Alcohol use: Yes     Comment: a bottle in one sitting on the weekends    Drug use: Not Currently     Comment: previously smoked marijuana     Social Determinants of Health     Financial Resource Strain: Low Risk     Difficulty of Paying Living Expenses: Not hard at all   Food Insecurity: Food Insecurity Present    Worried About Running Out of Food in the Last Year: Sometimes true    Lidia of Food in the Last Year: Sometimes true   Transportation Needs: Unknown    Lack of Transportation (Non-Medical): No   Housing Stability: Unknown    Unstable Housing in the Last Year: No           Review of Systems - History obtained from chart review and the patient  General ROS: positive for  - fatigue  Psychological ROS: {:781520}  Ophthalmic ROS: {:615821}  ENT ROS: {:723192}  Allergy and Immunology ROS: {:443097}  Hematological and Lymphatic ROS: {:588418}  Endocrine ROS: {:414347}  Breast ROS: {:517389}  Respiratory ROS: {:555533}  Cardiovascular ROS: {:019025}  Gastrointestinal ROS: {:064520}  Genito-Urinary ROS: {:691780}  Musculoskeletal ROS: {:992112}  Neurological ROS: {:502299}  Dermatological ROS: {:289697}        Physical Exam  HENT:      Head: Normocephalic and atraumatic. Right Ear: External ear normal.      Left Ear: External ear normal.      Nose: Nose normal.      Mouth/Throat:      Mouth: Mucous membranes are moist.   Eyes:      Extraocular Movements: Extraocular movements intact. Pupils: Pupils are equal, round, and reactive to light. Cardiovascular:      Rate and Rhythm: Normal rate and regular rhythm. Pulses: Normal pulses. Heart sounds: Normal heart sounds. Pulmonary:      Effort: Pulmonary effort is normal.      Breath sounds: Normal breath sounds. Abdominal:      General: Abdomen is flat. Musculoskeletal:         General: Normal range of motion. Cervical back: Normal range of motion. Skin:     General: Skin is warm and dry. Neurological:      General: No focal deficit present. Mental Status: She is alert and oriented to person, place, and time. Psychiatric:         Mood and Affect: Mood normal.         Behavior: Behavior normal.         Thought Content: Thought content normal.         Judgment: Judgment normal.           Imaging reviewed:        MRI breast 1/10/23:  Impression   1. Focal, heterogeneous non mass enhancement in the left breast 2 o'clock   measuring up to 2.4 cm in size is consistent with biopsy proven neoplasm. No   additional foci of disease identified on the left. 2.  No evidence of neoplasm in the right breast.       RECOMMENDATION:       Continued surgical and oncologic consultation is advised.        BIRADS:   6 - Known Neoplasm         Radiation Safety and Treatment Support:  -previous Radiation history: No  -history of connective tissue disease: No  -history of autoimmune disease: No  -pregnant: not applicable  -fertility conservation and /or contraception discussed: no  -nutrition consult prior to [address]: Yes  -PEG: No  -Dental evaluation prior to treatment:Yes  -Social Work requested: Yes  -Oncology Nurse Navigator requested: Yes  -pre + post treatment PT / Rehab / PM+R evaluation considered: Yes  -ICD: No   -ICD brand: -  -[facility] patient navigator: [facility]  -Nurse Practitioners for Radiation Oncology:    ---Aimee Nuno, MSN, RN, FNP-C   ---Mahi Mendez, MSN, RN, FNP-BC        Assessment and Plan: Tomasz Bustamante is a pleasant and cooperative 36year old with a recent diagnosis of AJCC stage group I breats cnacer (*** *** ***). It was a pleasure meeting *** today and we appreciate the referral and opportunity to be involved in {Desc; his/her:08668} care. We had an extensive discussion today regarding the course to date (including a focused review of the applicable radiographic and laboratory information), multidisciplinary approach to cancer care, and indications for external beam radiation therapy as a component therein. *** A literature review and multidisciplinary discussion was performed *** seeing this patient due to the complexity of the medical decision making in this case. I personally spent greater than *** minutes on this case and with this patient. I performed the complete history and physical as above at today's visit, at least 45 minutes was in direct discussion and  regarding disease management. Cindy Rodarte. Aparna Kelley MD [place] [address] Oncology  Cell: [phone]    [facility]:  [facility]: [phone]  [facility]:  [phone]   FAX:    [phone]  [facility]:  [phone]   FAX:  [phone]        NOTE: This report was transcribed using voice recognition software.  Every effort was made to ensure accuracy; however, inadvertent computerized transcription errors may be present. hypofractionation will also be considered based on the ACR / MARIA DE JESUS guidelines) as well as regional LN irradiation based on clinical and pathology characteristic (a thorough discussion of the potential benefits and risks on this specific aspect of treatment was performed PRN). The risks (detailed discussion), benefits, alternatives, process and logistics of external beam radiation were reviewed. Specifically, we discussed the possible chronic radiation toxicity which may include but is not limited to lymphedema, pneumonitis, skin/cartilage necrosis (rare), rib fracture (rare) , joint pain, brachioplexopathy and cosmetic changes. We answered all of the patient's questions to the best of our ability who verbalized understanding and seemed satisfied. Radiation planning will commence within 7 days (pending [address] recs); the next step in management being the simulation scan, with external beam radiation to commence in a timely fashion thereafter. For left sided breast cancer and select right sided cases, utilization of the Active Breathing Coordinator and / or surface guided fractionated external beam radiation therapy (with Vision-RT / 4D) will be considered to reduce radiation dose to the heart (and lung in certain situations) [Alicia et al. PRO. 2015 /// Shanique et al. Harvinder Borja. 2011 /// Kimberley et al. Stevie Lee. 2012  /// Ham Morales. 2017]. It was a pleasure meeting Krystle today and we appreciate the referral and opportunity to be involved in her care. We had an extensive discussion today regarding the course to date (including a focused review of the applicable radiographic and laboratory information), multidisciplinary approach to cancer care, and indications for external beam radiation therapy as a component therein. A literature review and multidisciplinary discussion was performed after seeing this patient due to the complexity of the medical decision making in this case.  I personally spent greater than 75 minutes on this case and with this patient. I performed the complete history and physical as above at today's visit, at least 45 minutes was in direct discussion and  regarding disease management.          -sim pending [address] recs  -rec whole breast + bst, cardiac sparing        Dannielle Resendez. Patricia Metzger MD [place] [address] Oncology  Cell: [phone]    [facility]:  [facility] [address]: [phone]  [address] [place] [address] Street:  [phone]   FAX:    [phone]  [address] [place] [address] Road:  [phone]   FAX:  [phone]        NOTE: This report was transcribed using voice recognition software. Every effort was made to ensure accuracy; however, inadvertent computerized transcription errors may be present.

## 2023-03-03 NOTE — TELEPHONE ENCOUNTER
Met with patient during her initial consultation with Dr Zehra Flor for her recent Breast cancer diagnosis. Introduced myself and explained my role with patients receiving treatment at our center. Patient was friendly and receptive. She states she is doing good. She was planning on getting implants before all of this happened with her Breast cancer diagnosis. She is unsure of how that is going to go now that she needs treatments. She has good support with family and boyfriend. She works as an [employer] and is in the process of switching from nights to days. She does have appetite issues. When reviewing information on Dietician she stated that she was on a pill when she was younger that helped her eat and sleep. She told her PCP that she needs to be put back on that pill because she has a fast metabolism. She states they told her she needed to speak with a . I forwarded the information to Ebony Best Social Work. Next steps include Medical Oncology consult (number provided), CT simulation and Radiation planning and treatments per Dr Rukhsana Ryan recommendations and follow up care. Provided patient with literature  on ACS Radiation Therapy Side Effects, [address], [facility], [employer] [facility] [place]. Reviewed resources available including Social Work and [employer]. Provided with my contact information and encouraged patient to call me with questions or concerns. Reviewed that she will meet with Gisell Riley at her Medical Oncology appointment. Verbalizes understanding. Patient appreciative of visit.

## 2023-03-06 ENCOUNTER — TELEPHONE (OUTPATIENT)
Dept: RADIATION ONCOLOGY | Age: 43
End: 2023-03-06

## 2023-03-06 NOTE — TELEPHONE ENCOUNTER
Late entry 3/3/23    SW met with pt prior to her consultation with Dr. Rebel Riley. SW followed up on positive distress screen. Pt reported having a history of bipolar disorder and dealing with a fair amount of anxiety at this time. SW discussed some possible resources and pt discussed wanting to possibly go back on some medications to help deal with her anxiety at this time. SW spoke with Dr. Rebel Riley who was agreeable to put in a referral for palliative medicine and SW offered pt other resources for services in the area. Pt was thankful at this time. Pt was encouraged to reach out to this provider should any other needs arise.       SYD Mendoza  Oncology Social Worker

## 2023-03-09 ENCOUNTER — TELEPHONE (OUTPATIENT)
Dept: ONCOLOGY | Age: 43
End: 2023-03-09

## 2023-03-09 ENCOUNTER — OFFICE VISIT (OUTPATIENT)
Dept: ONCOLOGY | Age: 43
End: 2023-03-09
Payer: MEDICAID

## 2023-03-09 ENCOUNTER — HOSPITAL ENCOUNTER (OUTPATIENT)
Dept: INFUSION THERAPY | Age: 43
Discharge: HOME | End: 2023-03-09

## 2023-03-09 VITALS
DIASTOLIC BLOOD PRESSURE: 65 MMHG | HEIGHT: 61 IN | SYSTOLIC BLOOD PRESSURE: 102 MMHG | TEMPERATURE: 97.7 F | OXYGEN SATURATION: 100 % | HEART RATE: 84 BPM | BODY MASS INDEX: 20.39 KG/M2 | WEIGHT: 108 LBS

## 2023-03-09 DIAGNOSIS — Z17.0 MALIGNANT NEOPLASM OF LEFT BREAST IN FEMALE, ESTROGEN RECEPTOR POSITIVE, UNSPECIFIED SITE OF BREAST (HCC): Primary | ICD-10-CM

## 2023-03-09 DIAGNOSIS — C50.912 MALIGNANT NEOPLASM OF LEFT BREAST IN FEMALE, ESTROGEN RECEPTOR POSITIVE, UNSPECIFIED SITE OF BREAST (HCC): Primary | ICD-10-CM

## 2023-03-09 PROCEDURE — 99205 OFFICE O/P NEW HI 60 MIN: CPT | Performed by: INTERNAL MEDICINE

## 2023-03-09 PROCEDURE — G8420 CALC BMI NORM PARAMETERS: HCPCS | Performed by: INTERNAL MEDICINE

## 2023-03-09 PROCEDURE — 1036F TOBACCO NON-USER: CPT | Performed by: INTERNAL MEDICINE

## 2023-03-09 PROCEDURE — G8484 FLU IMMUNIZE NO ADMIN: HCPCS | Performed by: INTERNAL MEDICINE

## 2023-03-09 PROCEDURE — G8427 DOCREV CUR MEDS BY ELIG CLIN: HCPCS | Performed by: INTERNAL MEDICINE

## 2023-03-09 NOTE — PROGRESS NOTES
Joesph Griffiths  1980 36 y.o. Referring Physician: Dr Nathan Reynolds MD, PhD    PCP: Laquita Reynaga MD    Vitals:    23 1426   BP: 102/65   Pulse: 84   Temp: 97.7 °F (36.5 °C)   SpO2: 100%        Wt Readings from Last 3 Encounters:   23 108 lb (49 kg)   23 113 lb 4.8 oz (51.4 kg)   23 107 lb (48.5 kg)        Body mass index is 20.41 kg/m². Chief Complaint:   Chief Complaint   Patient presents with    New Patient    Breast Cancer         Cancer Staging  Malignant neoplasm of left breast [facility])  Staging form: Breast, AJCC 8th Edition  - Clinical stage from 2023: Stage IA (cT1a, cN0, cM0, G1, ER+, WA+, HER2-) - Signed by Donovan Gardiner MD on 2023      Prior Radiation Therapy? NO    Concurrent Chemo/radiation? NO    Prior Chemotherapy? NO    Prior Hormonal Therapy? NO    Head and Neck Cancer? No, patient does NOT have HN cancer. LMP: 3-    Age at first Menses: 14    : 4    Para: 4          Current Outpatient Medications:     ondansetron (ZOFRAN) 4 MG tablet, Take 1 tablet by mouth 3 times daily as needed for Nausea or Vomiting, Disp: 15 tablet, Rfl: 0    blood glucose monitor kit and supplies, Dispense sufficient amount for indicated testing frequency plus additional to accommodate PRN testing needs.  Dispense all needed supplies to include: monitor, strips, lancing device, lancets, control solutions, alcohol swabs., Disp: 1 kit, Rfl: 0    metFORMIN (GLUCOPHAGE) 1000 MG tablet, Take 1 tablet by mouth 2 times daily (with meals), Disp: 60 tablet, Rfl: 3       Past Medical History:   Diagnosis Date    Abnormal Pap smear     repeat was normal    Cancer ([facility] [address] 2022    breast    Diabetes mellitus ([facility] [address] 2014    Sickle cell trait [facility])        Past Surgical History:   Procedure Laterality Date    BREAST BIOPSY      BREAST BIOPSY Left 2023    Left breast bracketed 2 mag seeds localized lumpectomy left axillary sentinel lymph node biopsy, performed by Thor Toth MD at [address]       [facility] LOC BREAST BIOPSY LEFT Left 11/9/2022    DONNELL STEROTACTIC LOC BREAST BIOPSY LEFT 11/9/2022 [facility]Y [facility]       Family History   Problem Relation Age of Onset    Diabetes Mother     Alcohol Abuse Mother     Heart Failure Father     Hypertension Maternal Grandmother        Social History     Socioeconomic History    Marital status: Legally      Spouse name: Not on file    Number of children: Not on file    Years of education: Not on file    Highest education level: Not on file   Occupational History    Not on file   Tobacco Use    Smoking status: Never    Smokeless tobacco: Never   Vaping Use    Vaping Use: Never used   Substance and Sexual Activity    Alcohol use: Yes     Alcohol/week: 1.0 standard drink     Types: 1 Glasses of wine per week     Comment: a bottle in one sitting on the weekends    Drug use: Not Currently     Comment: previously smoked marijuana    Sexual activity: Not on file   Other Topics Concern    Not on file   Social History Narrative    Not on file     Social Determinants of Health     Financial Resource Strain: Low Risk     Difficulty of Paying Living Expenses: Not hard at all   Food Insecurity: Food Insecurity Present    Worried About [facility] in the Last Year: Sometimes true    Ran Out of Food in the Last Year: Sometimes true   Transportation Needs: Unknown    Lack of Transportation (Medical): Not on file    Lack of Transportation (Non-Medical):  No   Physical Activity: Not on file   Stress: Not on file   Social Connections: Not on file   Intimate Partner Violence: Not on file   Housing Stability: Unknown    Unable to Pay for Housing in the Last Year: Not on file    Number of Places Lived in the Last Year: Not on file    Unstable Housing in the Last Year: No           Occupation: [employer]  Retired:  NO          [address]: <<For Level 5, 10 or more systems>> Pacemaker/Defibulator/ICD:  No    Mediport: No           FALLS RISK SCREENING ASSESSMENT    Instructions:  Assess the patient and [place] the appropriate indicators that are present for fall risk identification. Total the numbers circled and assign a fall risk score from Table 2.  Reassess patient at a minimum every 12 weeks or with status change. Assessment   Date  3/9/2023     1. Mental Ability: confusion/cognitively impaired No - 0       2. Elimination Issues: incontinence, frequency No - 0       3. Ambulatory: use of assistive devices (walker, cane, off-loading devices), attached to equipment (IV pole, oxygen) No - 0     4. Sensory Limitations: dizziness, vertigo, impaired vision No - 0       5. Age Less than 65 years - 0       6. Medication: diuretics, strong analgesics, hypnotics, sedatives, antihypertensive agents   No - 0   7. Falls:  recent history of falls within the last 3 months (not to include slipping or tripping)   No - 0   TOTAL 0    If score of 4 or greater was education given? No       TABLE 2   Risk Score Risk Level Plan of Care   0-3 Little or  No Risk 1. Provide assistance as indicated for ambulation activities  2. Reorient confused/cognitively impaired patient  3. Call-light/bell within patient's reach  4. Chair/bed in low position, stretcher/bed with siderails up except when performing patient care activities  5. Educate patient/family/caregiver on falls prevention  6.  Reassess in 12 weeks or with any noted change in patient condition which places them at a risk for a fall   4-6 Moderate Risk 1. Provide assistance as indicated for ambulation activities  2. Reorient confused/cognitively impaired patient  3. Call-light/bell within patient's reach  4. Chair/bed in low position, stretcher/bed with siderails up except when performing patient care activities  5. Educate patient/family/caregiver on falls prevention  6.   Falls risk precaution (Yellow sticker Level II) placed on patient chart   7 or   Higher High Risk 1. Place patient in easily observable treatment room  2. Patient attended at all times by family member or staff  3. Provide assistance as indicated for ambulation activities  4. Reorient confused/cognitively impaired patient  5. Call-light/bell within patient's reach  6. Chair/bed in low position, stretcher/bed with siderails up except when performing patient care activities  7. Educate patient/family/caregiver on falls prevention  8. Falls risk precaution (Yellow sticker Level III) placed on patient chart           MALNUTRITION RISK SCREENING ASSESSMENT    Instructions:  Assess the patient and enter the appropriate indicators that are present for nutrition risk identification. Total the numbers entered and assign a risk score. Follow the appropriate action for total score listed below. Assessment   Date  3/9/2023     Have you lost weight without trying? 2- Yes, 5.1 kg to 10 kg  Patient thinks she lost about 30 pounds in the last month   Have you been eating poorly because of a decreased appetite? 1- Yes   3. Do you have a diagnosis of head and neck cancer? 0- No                                                                                    TOTAL 3        Score of 0-1: No action  Score 2 or greater:   For Non-Diabetic Patient: Recommend adding Ensure Enlive 2 x daily and provide patient with Ensure wellness bag with coupons  For Diabetic Patient: Recommend adding Glucerna Shake 2 x daily and provide patient with Glucerna Wellness bag with coupons  Route to the dietitian via [email or web address]    Are you having  difficulty performing daily routine tasks  due to fatigue or weakness (ie: bathing/showering, dressing, housework, meal prep, work, child Rich Mouse): No     Do you have any arm flexibility/ROM restrictions, swelling or pain that limit activity: No     Any changes in memory, attention/focus that impact daily activities: No     Do you avoid participation in leisure/social activity due weakness, fatigue or pain: No     ARE ANY OF THE ABOVE ARE ANSWERED YES: No          PT ASSESSMENT FOR REFERRAL    Have you had any recent falls in past 2 months: No     Do you have difficulty  going up/down stairs: No     Are you having difficulty walking: No     Do you often hold onto furniture/environmental supports or feel off balance when you are walking: No     Do you need to take rest breaks when you are walking: No     Any pain on scale of 1-10 that limits your mobility: No 0/10    ARE ANY OF THE ABOVE ARE ANSWERED YES: No           PREHAB REFERRALS FOR NEOADJUVANT BREAST CANCER PATIENTS    Is this patient a breast cancer patient requiring neoadjuvant chemotherapy: No, this patient does NOT require neoadjuvant chemotherapy. LYMPHEDEMA SCREENING ASSESSMENT FOR PATIENTS WITH BREAST CANCER    The patient reports the following signs/symptoms of lymphedema: None    Please ask the provider to assess patient for lymphedema for any reported signs or symptoms so a referral to Lymphedema Therapy can be considered. PELVIC FLOOR DYSFUNCTION SCREENING ASSESSMENT    - Do you have any pelvic pain? No     - Do you have any fecal constipation or fecal incontinence? No     - Do you have any urinary incontinence or difficulty starting to urinate? No     ARE ANY OF THE ABOVE ARE ANSWERED YES: No           PREHAB AUDIOLOGY REFERRAL    - Is patient planned to receive Cisplatin? No. This patient is not planned to start Cisplatin. - Is patient complaining of new onset hearing loss? No. Patient is not complaining of new onset hearing loss.         Magno Arce RN

## 2023-03-09 NOTE — PROGRESS NOTES
Department of [facility] Oncology  Attending Consult Note    Reason for Visit:  Consultation on a patient with Left Breast Cancer    Referring Physician: Cirilo Garcia MD     PCP:  Tc Villafuerte MD    History of Present Illness:  65-year-old female who had an abnormal mammogram 8/11/22 showing left breast group of pleomorphic micro-calcifications within the mid to posterior parenchyma of the upper outer left breast. She then went to [place] to see about implants. 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 Associated microcalcifications. Focal fibrocystic changes. Breast Cancer Marker Studies:   Estrogen Receptors (ER): 95%   Progesterone Receptors (PA): >95%   HER2/charlene protein expression: Negative (0). Ki-67 labeling index: <5%     CXR 11/23/2022: No acute process    MRI Bilateral Breast 01/10/2023:  Focal, heterogeneous non mass enhancement in the left breast 2 o'clock measuring up to 2.4 cm in size. No additional foci of disease identified on the left. No evidence of neoplasm in the right breast.    Left breast bracketed 2 mag seeds localized lumpectomy left axillary sentinel lymph node biopsy on 02/07/2023  A. Left breast, anterior with seed, lumpectomy:   - Ductal carcinoma in situ, intermediate nuclear grade, cribriform/papillary type;   - Cancerization of lobules by DCIS;   - Fibrocystic changes with apocrine metaplasia, sclerosing lobular hyperplasia, microcysts and stromal fibrosis/hyalinosis:   - Microcalcifications in DCIS;   - Magseed and focal fresh hemorrhage, consistent with changes of biopsy site;   - Dermal scar, see comment. B.   Left breast, posterior with seed, lumpectomy:    - Ductal carcinoma in situ, intermediate nuclear grade, cribriform and micropapillary types;    - Atypical ductal hyperplasia also present;    - Fibrocystic changes with sclerosing adenosis, papillomatosis, sclerosing lobular hyperplasia,   apocrine metaplasia, microcysts and dense stromal fibrosis/hyalinosis;    - Microcalcifications in DCIS and benign ducts;    - Clip material with scarring, foreign body-type giant cell reaction, aggregates of hemosiderin   macrophages, consistent with changes of previous needle biopsy site, see comment. C.  Left sentinel lymph node #1, biopsy: One (1) benign node showing prominent fatty replacement. D.  Left sentinel lymph node #2, biopsy: One (1) benign lymph node with reactive changes. Comment: In specimen B, the prior core needle biopsy site with clip was identified on gross examination. Extensive sections of the breast tissue surrounding the biopsy site show no histologic evidence of residual invasive carcinoma. It is likely that a small focus (2 mm) of invasive carcinoma has been completely removed by prior core needle biopsy on   11/09/2022 (see report: MBC-). The absence of residual invasive carcinoma is further confirmed by immunostaining for p63 on sections of the tissue blocks with DCIS from specimen A (A4, A5 and A6) and specimen B (B3 and B5) showing presence of a positively stained myoepithelial layer at the periphery along the atypical glands. The DCIS/cancerization of lobules and atypical ductal hyperplasia seen in the specimens A and B are confirmed by negative immunostaining for CK5/6 on sections of tissue blocks A4/A5/A6 and B3/B5. Information regarding the invasive carcinoma in the cancer case summary is from report ADQ-.   The closest distance between the resection margin and invasive carcinoma is the distance between the center of prior biopsy scar and the inked margins measured on slide B5.     CANCER CASE SUMMARY (combined information from specimens A, B and report  YULIYA-)   Procedure: Excision   Specimen Laterality: Left   Tumor Site: 2:00 Histologic Type: Invasive Carcinoma of no special type (ductal)   Histologic Grade: Mickie Histologic Score        Glandular/tubular differentiation: Score 1        Nuclear pleomorphism: Score 2        Mitotic rate: Score 1        Overall grade: G1 (scores of 4)   Tumor Size: 2 mm   Tumor Focality: Single focus   Ductal Carcinoma In Situ (DCIS): Present        Negative for extensive intraductal component        Size of DCIS: Cannot be determined (a few small foci)        Architectural patterns: Cribriform/papillary and micropapillary        Nuclear grade: Grade 2 (intermediate)        Necrosis: Not identified        Number of blocks with DCIS: 5        Number of blocks examined: 18        Lobular Carcinoma In Situ (LCIS): Not identified   Lymphatic and/or vascular invasion: Not identified   Microcalcifications: Present in DCIS   Treatment effect in the breast/lymph nodes: No known presurgical therapy   Margins:   Margin status for invasive carcinoma:         All margins negative for invasive carcinoma        Distance from invasive carcinoma to closest margin: 8 mm (see comment)        Closest margin to invasive carcinoma: Posterior   Margin status for DCIS:        All margins negative for DCIS        Distance from DCIS to closest margin in specimen A: < 1 mm        Closest margin to DCIS: Superior (red ink, slide A5)        Distance from DCIS to closest margin in specimen B: 4 mm        Closest margin to DCIS: Posterior black ink, slide B5        Regional Lymph Nodes        Regional lymph nodes present        All regional lymph nodes negative for tumor        Total number of lymph nodes examined: 2        Number of sentinel nodes examined: 2        pTNM classification (AJCC 8th Edition)        pT1a pN0 (no regional lymph node metastasis identified)        Ancillary Studies: ER(+)/UT(+)/HER2((-)/Ki-67: <5%     Referred to our clinic for further evaluation and treatment    Review of Systems;  CONSTITUTIONAL: No fever, chills. Fair appetite and energy level. ENMT: Eyes: No diplopia; Nose: No epistaxis. Mouth: No sore throat. RESPIRATORY: No hemoptysis, shortness of breath, cough. CARDIOVASCULAR: No chest pain, palpitations. GASTROINTESTINAL: No nausea/vomiting, abdominal pain, diarrhea/constipation. GENITOURINARY: No dysuria, urinary frequency, hematuria. NEURO: No syncope, presyncope, headache. Remainder:  ROS NEGATIVE    Past Medical History:      Diagnosis Date    Abnormal Pap smear     repeat was normal    Cancer ([facility] [address] 11/2022    breast    Diabetes mellitus ([facility] [address] 01/01/2014    Sickle cell trait [facility])      Past Surgical History:      Procedure Laterality Date    BREAST BIOPSY      BREAST BIOPSY Left 2/7/2023    Left breast bracketed 2 mag seeds localized lumpectomy left axillary sentinel lymph node biopsy, performed by Gianna Lackey MD at [address]      [facility]OT[facility] LOC BREAST BIOPSY LEFT Left 11/9/2022    DONNELL STEROTACTIC LOC BREAST BIOPSY LEFT 11/9/2022 JUDY MERINO [facility]     Family History:  Family History   Problem Relation Age of Onset    Heart Failure Father      Medications:  Reviewed and reconciled.     Social History:  Social History     Socioeconomic History    Marital status: Legally      Spouse name: Not on file    Number of children: Not on file    Years of education: Not on file    Highest education level: Not on file   Occupational History    Not on file   Tobacco Use    Smoking status: Never    Smokeless tobacco: Never   Vaping Use    Vaping Use: Never used   Substance and Sexual Activity    Alcohol use: Yes     Comment: a bottle in one sitting on the weekends    Drug use: Not Currently     Comment: previously smoked marijuana    Sexual activity: Not on file   Other Topics Concern    Not on file   Social History Narrative    Not on file     Social Determinants of Health     Financial Resource Strain: Low Risk     Difficulty of Paying Living Expenses: Not hard at all   Food Insecurity: Food Insecurity Present    Worried About Running Out of Food in the Last Year: Sometimes true    Ran Out of Food in the Last Year: Sometimes true   Transportation Needs: Unknown    Lack of Transportation (Medical): Not on file    Lack of Transportation (Non-Medical): No   Physical Activity: Not on file   Stress: Not on file   Social Connections: Not on file   Intimate Partner Violence: Not on file   Housing Stability: Unknown    Unable to Pay for Housing in the Last Year: Not on file    Number of Places Lived in the Last Year: Not on file    Unstable Housing in the Last Year: No     Allergies:  No Known Allergies    Physical Exam:  /65   Pulse 84   Temp 97.7 °F (36.5 °C)   Ht 5' 1\" (1.549 m)   Wt 108 lb (49 kg)   SpO2 100%   BMI 20.41 kg/m²   GENERAL: Alert, oriented x 3, not in acute distress.  HEENT: PERRLA; EOMI. Oropharynx clear.   NECK: Supple. Without lymphadenopathy.   LUNGS: Good air entry bilaterally. No wheezing, crackles or ronchi.   CARDIOVASCULAR: Regular rate. No murmurs, rubs or gallops.   ABDOMEN: Soft. Non-tender, non-distended.   EXTREMITIES: Without clubbing, cyanosis, or edema.   NEUROLOGIC: No focal deficits.   ECOG PS 1    Lab Results   Component Value Date    WBC 8.7 11/23/2022    HGB 10.6 (L) 11/23/2022    HCT 32.0 (L) 11/23/2022    MCV 80.8 11/23/2022     11/23/2022     Lab Results   Component Value Date     11/23/2022    K 3.6 11/23/2022     11/23/2022    CO2 24 11/23/2022    BUN 8 11/23/2022    CREATININE 0.6 11/23/2022    GLUCOSE 102 (H) 11/23/2022    CALCIUM 9.6 11/23/2022    PROT 8.3 11/23/2022    LABALBU 4.6 11/23/2022    BILITOT 1.3 (H) 11/23/2022    ALKPHOS 65 11/23/2022    AST 11 11/23/2022    ALT 6 11/23/2022    LABGLOM >60 11/23/2022    GFRAA >60 07/02/2021     Impression/Plan:  43 y/o female who underwent Left breast bracketed 2 mag seeds localized lumpectomy left axillary sentinel lymph node biopsy on 02/07/2023  A.   Left breast, anterior with seed, lumpectomy:   - Ductal carcinoma in situ, intermediate nuclear grade, cribriform/papillary type;   - Cancerization of lobules by DCIS;   - Fibrocystic changes with apocrine metaplasia, sclerosing lobular hyperplasia, microcysts and stromal fibrosis/hyalinosis:   - Microcalcifications in DCIS;   - Magseed and focal fresh hemorrhage, consistent with changes of biopsy site;   - Dermal scar, see comment. B. Left breast, posterior with seed, lumpectomy:    - Ductal carcinoma in situ, intermediate nuclear grade, cribriform and micropapillary types;    - Atypical ductal hyperplasia also present;    - Fibrocystic changes with sclerosing adenosis, papillomatosis, sclerosing lobular hyperplasia,   apocrine metaplasia, microcysts and dense stromal fibrosis/hyalinosis;    - Microcalcifications in DCIS and benign ducts;    - Clip material with scarring, foreign body-type giant cell reaction, aggregates of hemosiderin   macrophages, consistent with changes of previous needle biopsy site, see comment. C.  Left sentinel lymph node #1, biopsy: One (1) benign node showing prominent fatty replacement. D.  Left sentinel lymph node #2, biopsy: One (1) benign lymph node with reactive changes. CANCER CASE SUMMARY (combined information from specimens A, B and report MZS-)   Procedure: Excision   Specimen Laterality: Left   Tumor Site: 2:00   Histologic Type:  Invasive Carcinoma of no special type (ductal)   Histologic Grade: Mickie Histologic Score        Glandular/tubular differentiation: Score 1        Nuclear pleomorphism: Score 2        Mitotic rate: Score 1        Overall grade: G1 (scores of 4)   Tumor Size: 2 mm   Tumor Focality: Single focus   Ductal Carcinoma In Situ (DCIS): Present        Negative for extensive intraductal component        Size of DCIS: Cannot be determined (a few small foci)        Architectural patterns: Cribriform/papillary and micropapillary Nuclear grade: Grade 2 (intermediate)        Necrosis: Not identified        Number of blocks with DCIS: 5        Number of blocks examined: 18        Lobular Carcinoma In Situ (LCIS): Not identified   Lymphatic and/or vascular invasion: Not identified   Microcalcifications: Present in DCIS   Treatment effect in the breast/lymph nodes: No known presurgical therapy   Margins:   Margin status for invasive carcinoma: All margins negative for invasive carcinoma        Distance from invasive carcinoma to closest margin: 8 mm (see comment)        Closest margin to invasive carcinoma: Posterior   Margin status for DCIS:        All margins negative for DCIS        Distance from DCIS to closest margin in specimen A: < 1 mm        Closest margin to DCIS: Superior (red ink, slide A5)        Distance from DCIS to closest margin in specimen B: 4 mm        Closest margin to DCIS: Posterior black ink, slide B5        Regional Lymph Nodes        Regional lymph nodes present        All regional lymph nodes negative for tumor        Total number of lymph nodes examined: 2        Number of sentinel nodes examined: 2        pTNM classification (AJCC 8th Edition)        pT1a pN0 (no regional lymph node metastasis identified)     Breast Cancer Marker Studies:   Estrogen Receptors (ER): 95%   Progesterone Receptors (FL): >95%   HER2/charlene protein expression: Negative (0). Ki-67 labeling index: <5%   Pathology reviewed. Imaging reviewed. Labs reviewed. NCCN guidelines reviewed. Recommended adjuvant RT followed by consideration of adjuvant endocrine therapy. Radiation Oncology consulted  RTC when RT completed to discuss consideration of adjuvant endocrine therapy (Tamoxifen 20 mg po daily). Genetics: [employer] testing Negative  No known pathogenic or likely pathogenic variants were detected. Genetic testing reviewed. Thank you for allowing us to participate in the care of .  Diana Toledo MD 3/9/2023  I spent a total of 61 minutes on the date of the service which included preparing to see the patient, face-to-face patient care, completing clinical documentation, counseling and educating the family/family members/caregiver, ordering medications, tests, or procedures complicating results with the patient/family/caregiver.

## 2023-03-09 NOTE — TELEPHONE ENCOUNTER
Met with pt in conjunction with medical oncology consultation at request of cancer center staff. Pt is 23-year-old female being managed for breast cancer. Pt's mood appeared euthymic with full affect, she appeared A&Ox4, and she was willing/able to participate in session. She appeared appropriately dressed/groomed and was able to ambulate/transfer without assistance. Pt discussed ongoing family issues and history of bipolar disorder. She explored recent diagnosis stating that primary goal at this point is getting breast implants. Reviewed pt's chart and noted that pt is currently active with [facility] for counseling services. She inquired about medication for anxiety stating that she was afraid she would have a [place]. \"  Noted that is appears LISW had referred her to [facility]; pt indicated she had attempted to contact [facility] but had difficulty scheduling. Assisted pt in contacting [facility] who indicated that assessments are on a walk-in basis [id number] M-F. Pt provided with address and contact information and indicated that she will go later this week for evaluation. No additional needs identified at this time. Reviewed role of oncology SW and encouraged pt to notify this provider if additional needs arise. GARRETT HALE notified of above.         DERREK Kee, [facility]  Oncology Social Worker

## 2023-03-10 NOTE — PROGRESS NOTES
Patient did NOT stop at check out window, left without AVS.  Will call patient with next follow up appointment. Patient will be concurrent chemo / radiation therapy. Once Radiation Oncology notifies our office they are ready to start RT, our office will call and schedule patient.

## 2023-03-13 ENCOUNTER — TELEPHONE (OUTPATIENT)
Dept: INTERNAL MEDICINE | Age: 43
End: 2023-03-13

## 2023-03-21 ENCOUNTER — HOSPITAL ENCOUNTER (OUTPATIENT)
Dept: RADIATION ONCOLOGY | Age: 43
Discharge: HOME | End: 2023-03-21
Payer: MEDICAID

## 2023-03-21 PROCEDURE — 77290 THER RAD SIMULAJ FIELD CPLX: CPT | Performed by: RADIOLOGY

## 2023-03-21 PROCEDURE — 77332 RADIATION TREATMENT AID(S): CPT | Performed by: RADIOLOGY

## 2023-04-05 ENCOUNTER — HOSPITAL ENCOUNTER (OUTPATIENT)
Dept: RADIATION ONCOLOGY | Age: 43
Discharge: HOME | End: 2023-04-05
Payer: MEDICAID

## 2023-04-05 PROCEDURE — 77334 RADIATION TREATMENT AID(S): CPT | Performed by: RADIOLOGY

## 2023-04-05 PROCEDURE — 77295 3-D RADIOTHERAPY PLAN: CPT | Performed by: RADIOLOGY

## 2023-04-05 PROCEDURE — 77300 RADIATION THERAPY DOSE PLAN: CPT | Performed by: RADIOLOGY

## 2023-04-05 PROCEDURE — 77293 RESPIRATOR MOTION MGMT SIMUL: CPT | Performed by: RADIOLOGY

## 2023-04-17 ENCOUNTER — HOSPITAL ENCOUNTER (OUTPATIENT)
Dept: RADIATION ONCOLOGY | Age: 43
Discharge: HOME | End: 2023-04-17
Payer: MEDICAID

## 2023-04-17 PROCEDURE — 77412 RADIATION TX DELIVERY LVL 3: CPT | Performed by: RADIOLOGY

## 2023-04-18 ENCOUNTER — HOSPITAL ENCOUNTER (OUTPATIENT)
Dept: RADIATION ONCOLOGY | Age: 43
Discharge: HOME | End: 2023-04-18
Payer: MEDICAID

## 2023-04-18 PROCEDURE — 77336 RADIATION PHYSICS CONSULT: CPT | Performed by: RADIOLOGY

## 2023-04-18 PROCEDURE — 77417 THER RADIOLOGY PORT IMAGE(S): CPT | Performed by: RADIOLOGY

## 2023-04-18 PROCEDURE — 77412 RADIATION TX DELIVERY LVL 3: CPT | Performed by: RADIOLOGY

## 2023-04-19 ENCOUNTER — HOSPITAL ENCOUNTER (OUTPATIENT)
Dept: RADIATION ONCOLOGY | Age: 43
Discharge: HOME | End: 2023-04-19
Payer: MEDICAID

## 2023-04-19 VITALS
WEIGHT: 107.8 LBS | OXYGEN SATURATION: 99 % | SYSTOLIC BLOOD PRESSURE: 112 MMHG | RESPIRATION RATE: 18 BRPM | DIASTOLIC BLOOD PRESSURE: 68 MMHG | HEART RATE: 81 BPM | TEMPERATURE: 97.6 F | BODY MASS INDEX: 20.37 KG/M2

## 2023-04-19 DIAGNOSIS — C50.919 MALIGNANT NEOPLASM OF FEMALE BREAST, UNSPECIFIED ESTROGEN RECEPTOR STATUS, UNSPECIFIED LATERALITY, UNSPECIFIED SITE OF BREAST (HCC): Primary | ICD-10-CM

## 2023-04-19 PROCEDURE — 99999 PR OFFICE/OUTPT VISIT,PROCEDURE ONLY: CPT | Performed by: RADIOLOGY

## 2023-04-19 PROCEDURE — 77412 RADIATION TX DELIVERY LVL 3: CPT | Performed by: RADIOLOGY

## 2023-04-19 NOTE — PROGRESS NOTES
Warner Brown  4/19/2023  Wt Readings from Last 3 Encounters:   04/19/23 107 lb 12.8 oz (48.9 kg)   03/09/23 108 lb (49 kg)   03/03/23 113 lb 4.8 oz (51.4 kg)     Body mass index is 20.37 kg/m². Treatment Area:CTV Left Breast    Patient was seen today for weekly visit. Comfort Alteration  KPS:90%  Fatigue: None    Nutritional Alteration  Anorexia: No   Nausea: No   Vomiting: No     Skin Alteration   Sensation:no    Radiation Dermatitis:  no    Mucous Membrane Alteration  Drainage: No  Lymphedema: No    Emotional  Coping: effective    Sexuality Alteration  na    Injury, potential bleeding or infection: no        Lab Results   Component Value Date    WBC 8.7 11/23/2022     11/23/2022         /68   Pulse 81   Temp 97.6 °F (36.4 °C) (Temporal)   Resp 18   Wt 107 lb 12.8 oz (48.9 kg)   SpO2 99%   BMI 20.37 kg/m²   BP within normal range? yes         Assessment/Plan: Pt completed 6/20fx and 1596/5256cGy.     Shahla Estrada RN

## 2023-04-20 ENCOUNTER — HOSPITAL ENCOUNTER (OUTPATIENT)
Dept: RADIATION ONCOLOGY | Age: 43
Discharge: HOME | End: 2023-04-20
Payer: MEDICAID

## 2023-04-20 PROCEDURE — 77412 RADIATION TX DELIVERY LVL 3: CPT | Performed by: RADIOLOGY

## 2023-04-21 ENCOUNTER — HOSPITAL ENCOUNTER (OUTPATIENT)
Dept: RADIATION ONCOLOGY | Age: 43
Discharge: HOME | End: 2023-04-21
Payer: MEDICAID

## 2023-04-21 PROCEDURE — 77412 RADIATION TX DELIVERY LVL 3: CPT | Performed by: RADIOLOGY

## 2023-04-24 ENCOUNTER — HOSPITAL ENCOUNTER (OUTPATIENT)
Dept: RADIATION ONCOLOGY | Age: 43
Discharge: HOME | End: 2023-04-24
Payer: MEDICAID

## 2023-04-24 PROCEDURE — 77412 RADIATION TX DELIVERY LVL 3: CPT | Performed by: RADIOLOGY

## 2023-04-24 NOTE — PATIENT INSTRUCTIONS
Continue daily fractionated radiation therapy as scheduled. Please see weekly OTV note and intial consultation letter in [facility] for clinical details. Corrie Johnson. Mirtha Recinos MD MS Shahla Orozco:  [phone]   FAX: [phone] [address]:  [phone]   FAX:    [phone] [address]:  [phone]   FAX:  [phone]  Email: [email or web address]
clean/dry

## 2023-04-24 NOTE — PROGRESS NOTES
DEPARTMENT OF RADIATION ONCOLOGY ON TREATMENT VISIT         4/24/2023      NAME:  Sanjuana Estrada    YOB: 1980    Diagnosis: breast cancer    SUBJECTIVE:   Sanjuana Estrada has now received fractionated external beam radiation therapy - ongoing. Past medical, surgical, social and family histories reviewed and updated as indicated. Pain: controlled    ALLERGIES:  Patient has no known allergies. Current Outpatient Medications   Medication Sig Dispense Refill    ondansetron (ZOFRAN) 4 MG tablet Take 1 tablet by mouth 3 times daily as needed for Nausea or Vomiting 15 tablet 0    blood glucose monitor kit and supplies Dispense sufficient amount for indicated testing frequency plus additional to accommodate PRN testing needs. Dispense all needed supplies to include: monitor, strips, lancing device, lancets, control solutions, alcohol swabs. 1 kit 0    metFORMIN (GLUCOPHAGE) 1000 MG tablet Take 1 tablet by mouth 2 times daily (with meals) 60 tablet 3     No current facility-administered medications for this encounter. OBJECTIVE:  Alert and fully ambulatory. Pleasant and conversant. Physical Examination: General appearance - alert, well appearing, and in no distress. Wt Readings from Last 3 Encounters:   04/19/23 107 lb 12.8 oz (48.9 kg)   03/09/23 108 lb (49 kg)   03/03/23 113 lb 4.8 oz (51.4 kg)         ASSESSMENT/PLAN:     Patient is tolerating treatments well with expected toxicities. RBA were reviewed prior to first fraction and PRN. Current and planned dose reviewed. Goals of treatment and potential side effects were reviewed with the patient PRN. Treatment imaging has been personally reviewed for accuracy and precision. Questions answered to apparent satisfaction. Treatments will continue as planned. Logan Marroquin.  Kendrick Cabrera MD MS CINTHIA  Radiation Oncologist        [facility] ([address] [place] [address]): [phone] /// FAX: [phone]  [address] Rodger Dave):

## 2023-04-25 ENCOUNTER — HOSPITAL ENCOUNTER (OUTPATIENT)
Dept: RADIATION ONCOLOGY | Age: 43
Discharge: HOME | End: 2023-04-25
Payer: MEDICAID

## 2023-04-25 PROCEDURE — 77417 THER RADIOLOGY PORT IMAGE(S): CPT | Performed by: RADIOLOGY

## 2023-04-25 PROCEDURE — 77334 RADIATION TREATMENT AID(S): CPT | Performed by: RADIOLOGY

## 2023-04-25 PROCEDURE — 77336 RADIATION PHYSICS CONSULT: CPT | Performed by: RADIOLOGY

## 2023-04-25 PROCEDURE — 77300 RADIATION THERAPY DOSE PLAN: CPT | Performed by: RADIOLOGY

## 2023-04-25 PROCEDURE — 77412 RADIATION TX DELIVERY LVL 3: CPT | Performed by: RADIOLOGY

## 2023-04-26 ENCOUNTER — HOSPITAL ENCOUNTER (OUTPATIENT)
Dept: RADIATION ONCOLOGY | Age: 43
Discharge: HOME | End: 2023-04-26
Payer: MEDICAID

## 2023-04-26 VITALS
SYSTOLIC BLOOD PRESSURE: 108 MMHG | TEMPERATURE: 97.1 F | DIASTOLIC BLOOD PRESSURE: 72 MMHG | OXYGEN SATURATION: 100 % | HEART RATE: 89 BPM | BODY MASS INDEX: 20.5 KG/M2 | WEIGHT: 108.5 LBS | RESPIRATION RATE: 18 BRPM

## 2023-04-26 DIAGNOSIS — C50.919 MALIGNANT NEOPLASM OF FEMALE BREAST, UNSPECIFIED ESTROGEN RECEPTOR STATUS, UNSPECIFIED LATERALITY, UNSPECIFIED SITE OF BREAST (HCC): Primary | ICD-10-CM

## 2023-04-26 PROCEDURE — 77412 RADIATION TX DELIVERY LVL 3: CPT | Performed by: RADIOLOGY

## 2023-04-27 ENCOUNTER — HOSPITAL ENCOUNTER (OUTPATIENT)
Dept: RADIATION ONCOLOGY | Age: 43
Discharge: HOME | End: 2023-04-27
Payer: MEDICAID

## 2023-04-27 PROCEDURE — 77412 RADIATION TX DELIVERY LVL 3: CPT | Performed by: RADIOLOGY

## 2023-04-28 NOTE — PROGRESS NOTES
Sanjuana Estrada  4/26/2023  Wt Readings from Last 3 Encounters:   04/26/23 108 lb 8 oz (49.2 kg)   04/19/23 107 lb 12.8 oz (48.9 kg)   03/09/23 108 lb (49 kg)     Body mass index is 20.5 kg/m². Treatment Area:CTV left breast    Patient was seen today for weekly visit. Comfort Alteration  KPS:90%  Fatigue: Mild    Nutritional Alteration  Anorexia: No   Nausea: No   Vomiting: No     Skin Alteration   Sensation:good    Radiation Dermatitis:  using lotion and tolerating    Mucous Membrane Alteration  Drainage: No  Lymphedema: No    Emotional  Coping: effective    Sexuality Alteration  na    Injury, potential bleeding or infection: na        Lab Results   Component Value Date    WBC 8.7 11/23/2022     11/23/2022         /72   Pulse 89   Temp 97.1 °F (36.2 °C)   Resp 18   Wt 108 lb 8 oz (49.2 kg)   SpO2 100%   BMI 20.50 kg/m²   BP within normal range? yes           Assessment/Plan:11/20fx  2926/5256cGY and tolerating well.     Andressa Julian RN
[phone] /// FAX: [phone]  [address] Lucretia Siemens): [phone] /// FAX: [phone]

## 2023-04-28 NOTE — PATIENT INSTRUCTIONS
Continue daily fractionated radiation therapy as scheduled. Please see weekly OTV note and intial consultation letter in [facility] for clinical details. Ryan Lala. Allen Claros MD MS James Perezgrim:  [phone]   FAX: [phone]  Formerly named [facility]:  [phone]   FAX:    [phone] [address]:  [phone]   FAX:  [phone]  Email: [email or web address]

## 2023-05-03 ENCOUNTER — HOSPITAL ENCOUNTER (OUTPATIENT)
Dept: RADIATION ONCOLOGY | Age: 43
Discharge: HOME | End: 2023-05-03
Payer: MEDICAID

## 2023-05-03 VITALS
OXYGEN SATURATION: 100 % | SYSTOLIC BLOOD PRESSURE: 113 MMHG | WEIGHT: 110.9 LBS | RESPIRATION RATE: 18 BRPM | TEMPERATURE: 97.8 F | BODY MASS INDEX: 20.95 KG/M2 | HEART RATE: 95 BPM | DIASTOLIC BLOOD PRESSURE: 92 MMHG

## 2023-05-03 DIAGNOSIS — C50.919 MALIGNANT NEOPLASM OF FEMALE BREAST, UNSPECIFIED ESTROGEN RECEPTOR STATUS, UNSPECIFIED LATERALITY, UNSPECIFIED SITE OF BREAST (HCC): Primary | ICD-10-CM

## 2023-05-03 PROCEDURE — 77280 THER RAD SIMULAJ FIELD SMPL: CPT | Performed by: RADIOLOGY

## 2023-05-03 PROCEDURE — 77412 RADIATION TX DELIVERY LVL 3: CPT | Performed by: RADIOLOGY

## 2023-05-03 NOTE — PROGRESS NOTES
Percy Aliceamon  5/3/2023  Wt Readings from Last 3 Encounters:   05/03/23 110 lb 14.4 oz (50.3 kg)   04/26/23 108 lb 8 oz (49.2 kg)   04/19/23 107 lb 12.8 oz (48.9 kg)     Body mass index is 20.95 kg/m². Treatment Area:left breast    Patient was seen today for weekly visit. Comfort Alteration  KPS:80%  Fatigue: Moderate    Nutritional Alteration  Anorexia: No   Nausea: No   Vomiting: No     Skin Alteration   Sensation:good and using lotion    Radiation Dermatitis:  na    Mucous Membrane Alteration  Drainage: No  Lymphedema: No    Emotional  Coping: effective    Sexuality Alteration  na    Injury, potential bleeding or infection: na        Lab Results   Component Value Date    WBC 8.7 11/23/2022     11/23/2022         BP (!) 113/92   Pulse 95   Temp 97.8 °F (36.6 °C) (Skin)   Resp 18   Wt 110 lb 14.4 oz (50.3 kg)   SpO2 100%   BMI 20.95 kg/m²   BP within normal range? yes           Assessment/Plan:13/20fx   and tolerating well.     Artem Trent RN

## 2023-05-04 ENCOUNTER — HOSPITAL ENCOUNTER (OUTPATIENT)
Dept: RADIATION ONCOLOGY | Age: 43
Discharge: HOME | End: 2023-05-04
Payer: MEDICAID

## 2023-05-04 PROCEDURE — 77412 RADIATION TX DELIVERY LVL 3: CPT | Performed by: RADIOLOGY

## 2023-05-04 NOTE — PROGRESS NOTES
DEPARTMENT OF RADIATION ONCOLOGY ON TREATMENT VISIT         5/4/2023      NAME:  Tom Arceo    YOB: 1980    Diagnosis: breast cancer    SUBJECTIVE:   Tom Areco has now received fractionated external beam radiation therapy - ongoing. Past medical, surgical, social and family histories reviewed and updated as indicated. Pain: controlled    ALLERGIES:  Patient has no known allergies. Current Outpatient Medications   Medication Sig Dispense Refill    ondansetron (ZOFRAN) 4 MG tablet Take 1 tablet by mouth 3 times daily as needed for Nausea or Vomiting 15 tablet 0    blood glucose monitor kit and supplies Dispense sufficient amount for indicated testing frequency plus additional to accommodate PRN testing needs. Dispense all needed supplies to include: monitor, strips, lancing device, lancets, control solutions, alcohol swabs. 1 kit 0    metFORMIN (GLUCOPHAGE) 1000 MG tablet Take 1 tablet by mouth 2 times daily (with meals) 60 tablet 3     No current facility-administered medications for this encounter. OBJECTIVE:  Alert and fully ambulatory. Pleasant and conversant. Physical Examination: General appearance - alert, well appearing, and in no distress. Wt Readings from Last 3 Encounters:   05/03/23 110 lb 14.4 oz (50.3 kg)   04/26/23 108 lb 8 oz (49.2 kg)   04/19/23 107 lb 12.8 oz (48.9 kg)         ASSESSMENT/PLAN:     Patient is tolerating treatments well with expected toxicities. RBA were reviewed prior to first fraction and PRN. Current and planned dose reviewed. Goals of treatment and potential side effects were reviewed with the patient PRN. Treatment imaging has been personally reviewed for accuracy and precision. Questions answered to apparent satisfaction. Treatments will continue as planned. Valerie Watts.  MD Олег Alfaro  Radiation Oncologist        [facility] ([address] [place] [address]): [phone] /// FAX: [phone]  [facility]

## 2023-05-04 NOTE — PATIENT INSTRUCTIONS
Continue daily fractionated radiation therapy as scheduled. Please see weekly OTV note and intial consultation letter in [facility] for clinical details. Kaden Connell. Lalit Davis MD MS Rajput Deed:  [phone]   FAX: [phone] [address] [facility]:  [phone]   FAX:    [phone] [address]:  [phone]   FAX:  [phone]  Email: [email or web address]

## 2023-05-05 ENCOUNTER — HOSPITAL ENCOUNTER (OUTPATIENT)
Dept: RADIATION ONCOLOGY | Age: 43
Discharge: HOME | End: 2023-05-05
Payer: MEDICAID

## 2023-05-05 PROCEDURE — 77412 RADIATION TX DELIVERY LVL 3: CPT | Performed by: RADIOLOGY

## 2023-05-05 PROCEDURE — 77336 RADIATION PHYSICS CONSULT: CPT | Performed by: RADIOLOGY

## 2023-05-08 ENCOUNTER — HOSPITAL ENCOUNTER (OUTPATIENT)
Dept: RADIATION ONCOLOGY | Age: 43
Discharge: HOME | End: 2023-05-08
Payer: MEDICAID

## 2023-05-09 ENCOUNTER — HOSPITAL ENCOUNTER (OUTPATIENT)
Dept: RADIATION ONCOLOGY | Age: 43
Discharge: HOME | End: 2023-05-09
Payer: MEDICAID

## 2023-05-09 PROCEDURE — 77412 RADIATION TX DELIVERY LVL 3: CPT | Performed by: RADIOLOGY

## 2023-05-10 ENCOUNTER — HOSPITAL ENCOUNTER (OUTPATIENT)
Dept: RADIATION ONCOLOGY | Age: 43
Discharge: HOME | End: 2023-05-10
Payer: MEDICAID

## 2023-05-10 PROCEDURE — 77412 RADIATION TX DELIVERY LVL 3: CPT | Performed by: RADIOLOGY

## 2023-05-11 ENCOUNTER — HOSPITAL ENCOUNTER (OUTPATIENT)
Dept: INFUSION THERAPY | Age: 43
Discharge: HOME | End: 2023-05-11

## 2023-05-11 ENCOUNTER — OFFICE VISIT (OUTPATIENT)
Dept: ONCOLOGY | Age: 43
End: 2023-05-11
Payer: MEDICAID

## 2023-05-11 ENCOUNTER — HOSPITAL ENCOUNTER (OUTPATIENT)
Dept: RADIATION ONCOLOGY | Age: 43
Discharge: HOME | End: 2023-05-11
Payer: MEDICAID

## 2023-05-11 VITALS
BODY MASS INDEX: 20.63 KG/M2 | OXYGEN SATURATION: 100 % | HEART RATE: 78 BPM | SYSTOLIC BLOOD PRESSURE: 101 MMHG | DIASTOLIC BLOOD PRESSURE: 61 MMHG | TEMPERATURE: 98.3 F | WEIGHT: 109.2 LBS

## 2023-05-11 DIAGNOSIS — C50.912 MALIGNANT NEOPLASM OF LEFT BREAST IN FEMALE, ESTROGEN RECEPTOR POSITIVE, UNSPECIFIED SITE OF BREAST (HCC): Primary | ICD-10-CM

## 2023-05-11 DIAGNOSIS — Z79.810 USE OF TAMOXIFEN (NOLVADEX): ICD-10-CM

## 2023-05-11 DIAGNOSIS — Z17.0 MALIGNANT NEOPLASM OF LEFT BREAST IN FEMALE, ESTROGEN RECEPTOR POSITIVE, UNSPECIFIED SITE OF BREAST (HCC): Primary | ICD-10-CM

## 2023-05-11 PROCEDURE — 99212 OFFICE O/P EST SF 10 MIN: CPT

## 2023-05-11 PROCEDURE — 99214 OFFICE O/P EST MOD 30 MIN: CPT | Performed by: INTERNAL MEDICINE

## 2023-05-11 PROCEDURE — 77412 RADIATION TX DELIVERY LVL 3: CPT | Performed by: RADIOLOGY

## 2023-05-11 PROCEDURE — G8420 CALC BMI NORM PARAMETERS: HCPCS | Performed by: INTERNAL MEDICINE

## 2023-05-11 PROCEDURE — G8427 DOCREV CUR MEDS BY ELIG CLIN: HCPCS | Performed by: INTERNAL MEDICINE

## 2023-05-11 PROCEDURE — 1036F TOBACCO NON-USER: CPT | Performed by: INTERNAL MEDICINE

## 2023-05-11 RX ORDER — TAMOXIFEN CITRATE 20 MG/1
20 TABLET ORAL DAILY
Qty: 30 TABLET | Refills: 0 | Status: SHIPPED | OUTPATIENT
Start: 2023-05-15 | End: 2023-06-14

## 2023-05-11 NOTE — PROGRESS NOTES
Department of [facility] Oncology  Attending Clinic Note    Reason for Visit:  Follow-up on a patient with Left Breast Cancer    PCP:  Maninder Macias MD    History of Present Illness:  54-year-old female who had an abnormal mammogram 8/11/22 showing left breast group of pleomorphic micro-calcifications within the mid to posterior parenchyma of the upper outer left breast. She then went to [facility] to see about implants. 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 Associated microcalcifications. Focal fibrocystic changes. Breast Cancer Marker Studies:   Estrogen Receptors (ER): 95%   Progesterone Receptors (GA): >95%   HER2/charlene protein expression: Negative (0). Ki-67 labeling index: <5%     CXR 11/23/2022: No acute process    MRI Bilateral Breast 01/10/2023:  Focal, heterogeneous non mass enhancement in the left breast 2 o'clock measuring up to 2.4 cm in size. No additional foci of disease identified on the left. No evidence of neoplasm in the right breast.    Left breast bracketed 2 mag seeds localized lumpectomy left axillary sentinel lymph node biopsy on 02/07/2023  A. Left breast, anterior with seed, lumpectomy:   - Ductal carcinoma in situ, intermediate nuclear grade, cribriform/papillary type;   - Cancerization of lobules by DCIS;   - Fibrocystic changes with apocrine metaplasia, sclerosing lobular hyperplasia, microcysts and stromal fibrosis/hyalinosis:   - Microcalcifications in DCIS;   - Magseed and focal fresh hemorrhage, consistent with changes of biopsy site;   - Dermal scar, see comment. B.   Left breast, posterior with seed, lumpectomy:    - Ductal carcinoma in situ, intermediate nuclear grade, cribriform and micropapillary types;    - Atypical ductal hyperplasia also present;    - Fibrocystic changes with sclerosing

## 2023-05-12 ENCOUNTER — HOSPITAL ENCOUNTER (OUTPATIENT)
Dept: RADIATION ONCOLOGY | Age: 43
Discharge: HOME | End: 2023-05-12
Payer: MEDICAID

## 2023-05-12 VITALS
DIASTOLIC BLOOD PRESSURE: 60 MMHG | OXYGEN SATURATION: 99 % | HEART RATE: 82 BPM | SYSTOLIC BLOOD PRESSURE: 100 MMHG | RESPIRATION RATE: 18 BRPM | TEMPERATURE: 97.8 F | BODY MASS INDEX: 20.22 KG/M2 | WEIGHT: 107 LBS

## 2023-05-12 DIAGNOSIS — C50.919 MALIGNANT NEOPLASM OF FEMALE BREAST, UNSPECIFIED ESTROGEN RECEPTOR STATUS, UNSPECIFIED LATERALITY, UNSPECIFIED SITE OF BREAST (HCC): Primary | ICD-10-CM

## 2023-05-12 PROCEDURE — 77336 RADIATION PHYSICS CONSULT: CPT | Performed by: RADIOLOGY

## 2023-05-12 PROCEDURE — 99999 PR OFFICE/OUTPT VISIT,PROCEDURE ONLY: CPT | Performed by: RADIOLOGY

## 2023-05-12 PROCEDURE — 77412 RADIATION TX DELIVERY LVL 3: CPT | Performed by: RADIOLOGY

## 2023-05-12 ASSESSMENT — PAIN SCALES - GENERAL: PAINLEVEL_OUTOF10: 7

## 2023-05-12 ASSESSMENT — PAIN DESCRIPTION - PAIN TYPE: TYPE: CHRONIC PAIN

## 2023-05-12 ASSESSMENT — PAIN DESCRIPTION - LOCATION: LOCATION: BREAST

## 2023-05-12 NOTE — PROGRESS NOTES
Chris Hair  5/12/2023  Wt Readings from Last 3 Encounters:   05/12/23 107 lb (48.5 kg)   05/11/23 109 lb 3.2 oz (49.5 kg)   05/03/23 110 lb 14.4 oz (50.3 kg)     Body mass index is 20.22 kg/m². Treatment Area:CTV left breast    Patient was seen today for weekly visit. Comfort Alteration  KPS:90%  Fatigue: Mild    Nutritional Alteration  Anorexia: No   Nausea: No   Vomiting: No     Skin Alteration   Sensation:good but pain from surgery    Radiation Dermatitis:  na    Mucous Membrane Alteration  Drainage: No  Lymphedema: No    Emotional  Coping: effective    Sexuality Alteration  na    Injury, potential bleeding or infection: na        Lab Results   Component Value Date    WBC 8.7 11/23/2022     11/23/2022         /60   Pulse 82   Temp 97.8 °F (36.6 °C) (Skin)   Resp 18   Wt 107 lb (48.5 kg)   SpO2 99%   BMI 20.22 kg/m²   BP within normal range? yes           Assessment/Plan:completed 20fx and tolerated well. Discharge instructions given.     Lisa Harris RN

## 2023-05-12 NOTE — PROGRESS NOTES
Fiorella Hester  5/12/2023  7:36 AM          Current Outpatient Medications   Medication Sig Dispense Refill    [START ON 5/15/2023] tamoxifen (NOLVADEX) 20 MG tablet Take 1 tablet by mouth daily 30 tablet 0    ondansetron (ZOFRAN) 4 MG tablet Take 1 tablet by mouth 3 times daily as needed for Nausea or Vomiting 15 tablet 0    blood glucose monitor kit and supplies Dispense sufficient amount for indicated testing frequency plus additional to accommodate PRN testing needs. Dispense all needed supplies to include: monitor, strips, lancing device, lancets, control solutions, alcohol swabs. 1 kit 0    metFORMIN (GLUCOPHAGE) 1000 MG tablet Take 1 tablet by mouth 2 times daily (with meals) 60 tablet 3     No current facility-administered medications for this encounter. This is an up-to-date medication list.    Please take this list to your next care provider, and discard any previous medication lists.

## 2023-05-23 NOTE — PROGRESS NOTES
DEPARTMENT OF RADIATION ONCOLOGY ON TREATMENT VISIT         5/23/2023      NAME:  Lula Be    YOB: 1980    Diagnosis: breast cancer    SUBJECTIVE:   Lula Be has now received fractionated external beam radiation therapy - ongoing. Past medical, surgical, social and family histories reviewed and updated as indicated. Pain: controlled    ALLERGIES:  Patient has no known allergies. Current Outpatient Medications   Medication Sig Dispense Refill    tamoxifen (NOLVADEX) 20 MG tablet Take 1 tablet by mouth daily 30 tablet 0    ondansetron (ZOFRAN) 4 MG tablet Take 1 tablet by mouth 3 times daily as needed for Nausea or Vomiting 15 tablet 0    blood glucose monitor kit and supplies Dispense sufficient amount for indicated testing frequency plus additional to accommodate PRN testing needs. Dispense all needed supplies to include: monitor, strips, lancing device, lancets, control solutions, alcohol swabs. 1 kit 0    metFORMIN (GLUCOPHAGE) 1000 MG tablet Take 1 tablet by mouth 2 times daily (with meals) 60 tablet 3     No current facility-administered medications for this encounter. OBJECTIVE:  Alert and fully ambulatory. Pleasant and conversant. Physical Examination: General appearance - alert, well appearing, and in no distress. Wt Readings from Last 3 Encounters:   05/12/23 107 lb (48.5 kg)   05/11/23 109 lb 3.2 oz (49.5 kg)   05/03/23 110 lb 14.4 oz (50.3 kg)         ASSESSMENT/PLAN:     Patient is tolerating treatments well with expected toxicities. RBA were reviewed prior to first fraction and PRN. Current and planned dose reviewed. Goals of treatment and potential side effects were reviewed with the patient PRN. Treatment imaging has been personally reviewed for accuracy and precision. Questions answered to apparent satisfaction. Treatments will continue as planned. Edil Duque.  Blanka Peterson MD MS DABR  Radiation

## 2023-05-23 NOTE — PATIENT INSTRUCTIONS
Continue daily fractionated radiation therapy as scheduled. Please see weekly OTV note and intial consultation letter in [facility] for clinical details. Irish Borja. Chico Oswald MD MS Slater Fei:  [phone]   FAX: [phone]  [facility] [address]:  [phone]   FAX:    [phone]  [address] [place] [address] Road:  [phone]   FAX:  [phone]  Email: [email or web address]

## 2023-06-06 ENCOUNTER — TELEPHONE (OUTPATIENT)
Age: 43
End: 2023-06-06

## 2023-06-21 DIAGNOSIS — C50.912 MALIGNANT NEOPLASM OF LEFT BREAST IN FEMALE, ESTROGEN RECEPTOR POSITIVE, UNSPECIFIED SITE OF BREAST (HCC): Primary | ICD-10-CM

## 2023-06-21 DIAGNOSIS — Z17.0 MALIGNANT NEOPLASM OF LEFT BREAST IN FEMALE, ESTROGEN RECEPTOR POSITIVE, UNSPECIFIED SITE OF BREAST (HCC): Primary | ICD-10-CM

## 2023-07-13 ENCOUNTER — TELEPHONE (OUTPATIENT)
Dept: CASE MANAGEMENT | Age: 43
End: 2023-07-13

## 2023-07-13 ENCOUNTER — HOSPITAL ENCOUNTER (OUTPATIENT)
Dept: INFUSION THERAPY | Age: 43
Discharge: HOME | End: 2023-07-13
Payer: MEDICAID

## 2023-07-13 ENCOUNTER — OFFICE VISIT (OUTPATIENT)
Dept: ONCOLOGY | Age: 43
End: 2023-07-13
Payer: MEDICAID

## 2023-07-13 VITALS
TEMPERATURE: 98.8 F | HEIGHT: 61 IN | HEART RATE: 95 BPM | WEIGHT: 107 LBS | SYSTOLIC BLOOD PRESSURE: 100 MMHG | OXYGEN SATURATION: 99 % | DIASTOLIC BLOOD PRESSURE: 58 MMHG | BODY MASS INDEX: 20.2 KG/M2

## 2023-07-13 DIAGNOSIS — C50.912 MALIGNANT NEOPLASM OF LEFT BREAST IN FEMALE, ESTROGEN RECEPTOR POSITIVE, UNSPECIFIED SITE OF BREAST (HCC): ICD-10-CM

## 2023-07-13 DIAGNOSIS — C50.912 MALIGNANT NEOPLASM OF LEFT BREAST IN FEMALE, ESTROGEN RECEPTOR POSITIVE, UNSPECIFIED SITE OF BREAST (HCC): Primary | ICD-10-CM

## 2023-07-13 DIAGNOSIS — Z79.810 USE OF TAMOXIFEN (NOLVADEX): ICD-10-CM

## 2023-07-13 DIAGNOSIS — Z17.0 MALIGNANT NEOPLASM OF LEFT BREAST IN FEMALE, ESTROGEN RECEPTOR POSITIVE, UNSPECIFIED SITE OF BREAST (HCC): ICD-10-CM

## 2023-07-13 DIAGNOSIS — D50.9 IRON DEFICIENCY ANEMIA, UNSPECIFIED IRON DEFICIENCY ANEMIA TYPE: ICD-10-CM

## 2023-07-13 DIAGNOSIS — Z17.0 MALIGNANT NEOPLASM OF LEFT BREAST IN FEMALE, ESTROGEN RECEPTOR POSITIVE, UNSPECIFIED SITE OF BREAST (HCC): Primary | ICD-10-CM

## 2023-07-13 LAB
ALBUMIN SERPL-MCNC: 4 G/DL (ref 3.5–5.2)
ALP SERPL-CCNC: 72 U/L (ref 35–104)
ALT SERPL-CCNC: 17 U/L (ref 0–32)
ANION GAP SERPL CALCULATED.3IONS-SCNC: 9 MMOL/L (ref 7–16)
AST SERPL-CCNC: 19 U/L (ref 0–31)
BASOPHILS # BLD: 0.01 E9/L (ref 0–0.2)
BASOPHILS NFR BLD: 0.2 % (ref 0–2)
BILIRUB SERPL-MCNC: 0.4 MG/DL (ref 0–1.2)
BUN SERPL-MCNC: 8 MG/DL (ref 6–20)
CALCIUM SERPL-MCNC: 8.6 MG/DL (ref 8.6–10.2)
CHLORIDE SERPL-SCNC: 105 MMOL/L (ref 98–107)
CO2 SERPL-SCNC: 26 MMOL/L (ref 22–29)
CREAT SERPL-MCNC: 0.6 MG/DL (ref 0.5–1)
EOSINOPHIL # BLD: 0.04 E9/L (ref 0.05–0.5)
EOSINOPHIL NFR BLD: 0.7 % (ref 0–6)
ERYTHROCYTE [DISTWIDTH] IN BLOOD BY AUTOMATED COUNT: 16.3 FL (ref 11.5–15)
GLUCOSE SERPL-MCNC: 156 MG/DL (ref 74–99)
HCT VFR BLD AUTO: 29.3 % (ref 34–48)
HGB BLD-MCNC: 9.3 G/DL (ref 11.5–15.5)
IMM GRANULOCYTES # BLD: 0.02 E9/L
IMM GRANULOCYTES NFR BLD: 0.3 % (ref 0–5)
LYMPHOCYTES # BLD: 1.38 E9/L (ref 1.5–4)
LYMPHOCYTES NFR BLD: 22.9 % (ref 20–42)
MCH RBC QN AUTO: 26.4 PG (ref 26–35)
MCHC RBC AUTO-ENTMCNC: 31.7 % (ref 32–34.5)
MCV RBC AUTO: 83.2 FL (ref 80–99.9)
MONOCYTES # BLD: 0.59 E9/L (ref 0.1–0.95)
MONOCYTES NFR BLD: 9.8 % (ref 2–12)
NEUTROPHILS # BLD: 3.99 E9/L (ref 1.8–7.3)
NEUTS SEG NFR BLD: 66.1 % (ref 43–80)
PLATELET # BLD AUTO: 282 E9/L (ref 130–450)
PMV BLD AUTO: 9.3 FL (ref 7–12)
POTASSIUM SERPL-SCNC: 3.7 MMOL/L (ref 3.5–5)
PROT SERPL-MCNC: 7.3 G/DL (ref 6.4–8.3)
RBC # BLD AUTO: 3.52 E12/L (ref 3.5–5.5)
SODIUM SERPL-SCNC: 140 MMOL/L (ref 132–146)
WBC # BLD: 6 E9/L (ref 4.5–11.5)

## 2023-07-13 PROCEDURE — G8420 CALC BMI NORM PARAMETERS: HCPCS | Performed by: INTERNAL MEDICINE

## 2023-07-13 PROCEDURE — 99212 OFFICE O/P EST SF 10 MIN: CPT

## 2023-07-13 PROCEDURE — 1036F TOBACCO NON-USER: CPT | Performed by: INTERNAL MEDICINE

## 2023-07-13 PROCEDURE — 99213 OFFICE O/P EST LOW 20 MIN: CPT | Performed by: INTERNAL MEDICINE

## 2023-07-13 PROCEDURE — 36415 COLL VENOUS BLD VENIPUNCTURE: CPT

## 2023-07-13 PROCEDURE — G8427 DOCREV CUR MEDS BY ELIG CLIN: HCPCS | Performed by: INTERNAL MEDICINE

## 2023-07-13 RX ORDER — TAMOXIFEN CITRATE 20 MG/1
20 TABLET ORAL DAILY
Qty: 30 TABLET | Refills: 0 | Status: SHIPPED | OUTPATIENT
Start: 2023-07-13 | End: 2023-08-12

## 2023-07-13 NOTE — TELEPHONE ENCOUNTER
Met with patient during her follow up appointment with Dr.Francis pierre. Patient completed her active treatment for her breast cancer. Patient appears in good spirits. Provided support and encouragement. Instructed patient in detail on her Cancer Treatment Summary and Survivorship Care Plan. Provided patient with written copy and additional copy sent to patient's PCP Dr. Pete Faustin MD. Discussed NCCN recommendations for all cancer survivors of 150 minutes/week of moderate intensity exercise, achieving and maintaining a healthy weight, avoiding smoking or second hand smoke,and minimizing or avoidance of alcohol intake. Provided patient with [employer] for Cancer Survivors and Oncology Nutrition booklet. I also provided patient with [employer] information, makexyz, [employer]" Recommendation for follow up  Care after Treatment for Breast Cancer, OncoLink survivorship :Late Effect of Radiation for Breast Cancer, Dasha's Sister's, Breast Self exam guide and Laura Cohn RN NN business card. Patient is scheduled for follow up appointment 01/15/2024. After reviewing the Survivorship Treatment Summary and Care Plan, the patient verbalizes understanding of the recommendations for follow up. Instructed to call with any questions or concerns. Verbally agreed. Patient appreciative of visit and information. Patient will be discharged from a navigational standpoint.

## 2023-07-13 NOTE — PROGRESS NOTES
Department of [facility] Oncology  Attending Clinic Note    Reason for Visit:  Follow-up on a patient with Left Breast Cancer    PCP:  Pasquale Cuevas MD    History of Present Illness:  76-year-old female who had an abnormal mammogram 8/11/22 showing left breast group of pleomorphic micro-calcifications within the mid to posterior parenchyma of the upper outer left breast. She then went to [place] to see about implants. 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 Associated microcalcifications. Focal fibrocystic changes. Breast Cancer Marker Studies:   Estrogen Receptors (ER): 95%   Progesterone Receptors (OR): >95%   HER2/charlene protein expression: Negative (0). Ki-67 labeling index: <5%     CXR 11/23/2022: No acute process    MRI Bilateral Breast 01/10/2023:  Focal, heterogeneous non mass enhancement in the left breast 2 o'clock measuring up to 2.4 cm in size. No additional foci of disease identified on the left. No evidence of neoplasm in the right breast.    Left breast bracketed 2 mag seeds localized lumpectomy left axillary sentinel lymph node biopsy on 02/07/2023  A. Left breast, anterior with seed, lumpectomy:   - Ductal carcinoma in situ, intermediate nuclear grade, cribriform/papillary type;   - Cancerization of lobules by DCIS;   - Fibrocystic changes with apocrine metaplasia, sclerosing lobular hyperplasia, microcysts and stromal fibrosis/hyalinosis:   - Microcalcifications in DCIS;   - Magseed and focal fresh hemorrhage, consistent with changes of biopsy site;   - Dermal scar, see comment. B.   Left breast, posterior with seed, lumpectomy:    - Ductal carcinoma in situ, intermediate nuclear grade, cribriform and micropapillary types;    - Atypical ductal hyperplasia also present;    - Fibrocystic changes with

## 2023-09-11 RX ORDER — TAMOXIFEN CITRATE 20 MG/1
20 TABLET ORAL DAILY
Qty: 30 TABLET | Refills: 0 | Status: SHIPPED | OUTPATIENT
Start: 2023-09-11 | End: 2023-10-11

## 2023-09-18 ENCOUNTER — HOSPITAL ENCOUNTER (OUTPATIENT)
Dept: GENERAL RADIOLOGY | Age: 43
Discharge: HOME | End: 2023-09-20
Attending: SURGERY
Payer: MEDICAID

## 2023-09-18 ENCOUNTER — OFFICE VISIT (OUTPATIENT)
Dept: BREAST CENTER | Age: 43
End: 2023-09-18
Payer: MEDICAID

## 2023-09-18 VITALS
WEIGHT: 102 LBS | DIASTOLIC BLOOD PRESSURE: 60 MMHG | OXYGEN SATURATION: 100 % | BODY MASS INDEX: 19.26 KG/M2 | TEMPERATURE: 98.2 F | HEIGHT: 61 IN | RESPIRATION RATE: 20 BRPM | SYSTOLIC BLOOD PRESSURE: 116 MMHG | HEART RATE: 83 BPM

## 2023-09-18 DIAGNOSIS — C50.912 MALIGNANT NEOPLASM OF LEFT FEMALE BREAST, UNSPECIFIED ESTROGEN RECEPTOR STATUS, UNSPECIFIED SITE OF BREAST (HCC): ICD-10-CM

## 2023-09-18 DIAGNOSIS — C50.912 MALIGNANT NEOPLASM OF LEFT BREAST IN FEMALE, ESTROGEN RECEPTOR POSITIVE, UNSPECIFIED SITE OF BREAST (HCC): Primary | ICD-10-CM

## 2023-09-18 DIAGNOSIS — Z12.31 VISIT FOR SCREENING MAMMOGRAM: Primary | ICD-10-CM

## 2023-09-18 DIAGNOSIS — Z17.0 MALIGNANT NEOPLASM OF LEFT BREAST IN FEMALE, ESTROGEN RECEPTOR POSITIVE, UNSPECIFIED SITE OF BREAST (HCC): Primary | ICD-10-CM

## 2023-09-18 DIAGNOSIS — Z85.3 PERSONAL HISTORY OF BREAST CANCER: ICD-10-CM

## 2023-09-18 PROCEDURE — 99212 OFFICE O/P EST SF 10 MIN: CPT | Performed by: SURGERY

## 2023-09-18 PROCEDURE — 99213 OFFICE O/P EST LOW 20 MIN: CPT | Performed by: SURGERY

## 2023-09-18 PROCEDURE — 1036F TOBACCO NON-USER: CPT | Performed by: SURGERY

## 2023-09-18 PROCEDURE — 77063 BREAST TOMOSYNTHESIS BI: CPT

## 2023-09-18 PROCEDURE — G8427 DOCREV CUR MEDS BY ELIG CLIN: HCPCS | Performed by: SURGERY

## 2023-09-18 PROCEDURE — G8420 CALC BMI NORM PARAMETERS: HCPCS | Performed by: SURGERY

## 2023-09-18 NOTE — PROGRESS NOTES
Date of visit: 9/18/2023 11/23/22 02/22/23 08/16/23: Not seen  09/18/23      DIAGNOSIS:  1. (11/23/22) LEFT (2:00) invasive ductal carcinoma  Clinical stage: T1a(2mm)N0M0  ER (95) WV (95) HER2 (0) Ki67 (5)  Pathologic stage: T1a(2mm)N0Mx  2. Sickle cell trait    TREATMENT  1. (02/07/23) LEFT lumpectomy and SLNB  2. Adjuvant breast radiation  3. Tamoxifen    IMAGING/PROCEDURES:  1. (08/11/22) BILATERAL d-mammogram ([facility]): BIRADS-4  * LEFT breast calcifications  * Preop for augmentation  2. (11/09/22) LEFT stereotactic biopsy  *dk 1.7 x 1.7cm  3. (01/11/22) MRI: BIRADS-6  * LEFT (2:00) 2.4cm of enhancement at biopsy site  3. (08/16/23) BILATERAL st-mammogram:    09/18/23 due for imaging    Breast History  Nidia Lane was in the office for a routine visit. Dionte Patterson underwent a left breast lumpectomy and sentinel lymph node biopsy for early stage and biologically favorable breast cancer in February. Breast Symptoms  Krystle has had some occasional discomfort but no other concerning symptoms. Breast Examination  A comprehensive examination demonstrates no palpable or visible concerns in either breast or either axilla. Breast Imaging  Krystle underwent a mammogram today prior to her office visit. I reviewed these films and see no obvious concerns. I shared with the patient that this has to be read by our radiologist before finality. Assessment/Plan  Nidia Lane is doing well with no evidence of new or recurrent disease. I informed Dionte Patterson that we will continue yearly visits and imaging in the foreseeable future. Until her next visit we encourage self-examination and asked her to contact us with any concerns. This note was created with voice recognition software. Please excuse any grammatical errors that were not corrected and please contact me if there are any questions. [email or web address]  [phone]

## 2023-11-30 ENCOUNTER — OFFICE VISIT (OUTPATIENT)
Dept: OBGYN | Age: 43
End: 2023-11-30
Payer: MEDICAID

## 2023-11-30 VITALS
HEIGHT: 61 IN | BODY MASS INDEX: 20.01 KG/M2 | RESPIRATION RATE: 16 BRPM | WEIGHT: 106 LBS | DIASTOLIC BLOOD PRESSURE: 80 MMHG | HEART RATE: 82 BPM | SYSTOLIC BLOOD PRESSURE: 128 MMHG

## 2023-11-30 DIAGNOSIS — Z12.4 PAP SMEAR FOR CERVICAL CANCER SCREENING: ICD-10-CM

## 2023-11-30 DIAGNOSIS — Z01.419 WELL WOMAN EXAM WITH ROUTINE GYNECOLOGICAL EXAM: Primary | ICD-10-CM

## 2023-11-30 PROCEDURE — G8484 FLU IMMUNIZE NO ADMIN: HCPCS | Performed by: OBSTETRICS & GYNECOLOGY

## 2023-11-30 PROCEDURE — 99396 PREV VISIT EST AGE 40-64: CPT | Performed by: OBSTETRICS & GYNECOLOGY

## 2023-11-30 NOTE — PROGRESS NOTES
Chief complaint: 39 year- old presents for well woman exam.     History:    G4, P4,Ab0., 2 vaginal, 2   Doing well. Periods: Now irregular  Sexually active: yes . No abdominal or pelvic pain. No dyspareunia. No abnormal vaginal  discharge. Previous Pap smears normal. Last Pap smear was done at Dr. Gregory Powell and she was told it was negativre. Need to get a pap today. No urinary or GI symptoms. Medical/ surgical history and medications reviewed. Mammogram discussed and ordered. On metformin for pre diabetes. [place]  Diagnosed with left breast cancer about a year ago or so. Had surgery with Dr. Shilpi Abdalla. Last mammogram done recently and was negative. On Tamoxifen daily. Denies any other problems or symptoms today    ROS: Negative    Examination:    Vital signs reviewed. GA : Healthy. Oriented x 3 no distress. HEENT : hearing grossly intact, no jaundice, no oral lesions or nasal discharge. Neck : Supple. Thyroid : normal, no goiter. Breasts : Recent exam and mammogram doone by Dr. Shilpi Abdalla. .  Abdomen :Soft. No masses. No organomegaly. V&V : Normal female external genitalia. BUS: Normal.  PS : Adequate. Cervix : No lesions, pap smear done as a TPP with co-testing. Uterus : Normal size. Adnexa : Free, no masses. IMP: Normal well woman, S/P left breast ductal Ca, Pap for can screening.     Plan: See in 2 years if pap is normal.

## 2023-12-01 LAB
HPV SAMPLE: NORMAL
HPV SOURCE: NORMAL
HPV, GENOTYPE 16: NORMAL
HPV, GENOTYPE 18: NORMAL
HPV, HIGH RISK OTHER: NORMAL
HPV, INTERPRETATION: NORMAL

## 2023-12-06 LAB
GYNECOLOGY CYTOLOGY REPORT: NORMAL
HPV SAMPLE: NORMAL
HPV SOURCE: NORMAL
HPV, GENOTYPE 16: NOT DETECTED
HPV, GENOTYPE 18: NOT DETECTED
HPV, HIGH RISK OTHER: NOT DETECTED
HPV, INTERPRETATION: NORMAL

## 2024-01-15 ENCOUNTER — HOSPITAL ENCOUNTER (OUTPATIENT)
Dept: INFUSION THERAPY | Age: 44
Discharge: HOME | End: 2024-01-15

## 2024-01-17 ENCOUNTER — OFFICE VISIT (OUTPATIENT)
Dept: SURGERY | Age: 44
End: 2024-01-17

## 2024-01-17 VITALS
RESPIRATION RATE: 20 BRPM | DIASTOLIC BLOOD PRESSURE: 60 MMHG | HEART RATE: 102 BPM | HEIGHT: 61 IN | SYSTOLIC BLOOD PRESSURE: 110 MMHG | BODY MASS INDEX: 20.64 KG/M2 | TEMPERATURE: 97.3 F | OXYGEN SATURATION: 98 % | WEIGHT: 109.3 LBS

## 2024-01-17 DIAGNOSIS — Z17.0 MALIGNANT NEOPLASM OF LEFT BREAST IN FEMALE, ESTROGEN RECEPTOR POSITIVE, UNSPECIFIED SITE OF BREAST (HCC): Primary | ICD-10-CM

## 2024-01-17 DIAGNOSIS — N64.89 BREAST ASYMMETRY: ICD-10-CM

## 2024-01-17 DIAGNOSIS — C50.912 MALIGNANT NEOPLASM OF LEFT BREAST IN FEMALE, ESTROGEN RECEPTOR POSITIVE, UNSPECIFIED SITE OF BREAST (HCC): Primary | ICD-10-CM

## 2024-01-25 ENCOUNTER — TELEPHONE (OUTPATIENT)
Dept: SURGERY | Age: 44
End: 2024-01-25

## 2024-01-25 NOTE — TELEPHONE ENCOUNTER
Medicare Clearance needed pt notified      Surgery has been scheduled at [facility] [address] [place], [place] on 2/1/2024, Pre-Admission Testing will call you prior to surgery to inform you arrival time and any other additional directions,if they are unable to reach you,please call them two days prior at [phone].  If taking Fish Oil, Vitamins, two weeks prior to surgery stop taking. If taking NSAIDS (such as Aspirin, Ibuprofen) anticoagulants please consult with your prescribing physician to get further instructions on when to stop medication prior to surgery that is scheduled, patient understood.    Pre-Auth #:  CPT Codes:   ICD 10:      Phone discussion was had with the patient today regarding upcoming surgery.  I had a discussion with the patient that although the patient's insurance company has approved the procedure proposed, there is no guarantee of payment by the insurance company on their final review following the procedure. This is also reflected in the letter received by this office and the patient from the insurance company.    The patient has voiced to me complete understanding of this scenario, understands that they will be responsible for their individual deductable/coinsurance balance as an out-of-pocket expense and possibly the financial responsibility of the surgical procedure if the patient's insurance company elects not to pay for certain or all services.    The patient elects to proceed with the proposed surgical plan.

## 2024-01-26 ENCOUNTER — PREP FOR PROCEDURE (OUTPATIENT)
Dept: SURGERY | Age: 44
End: 2024-01-26

## 2024-01-26 PROBLEM — C50.912 BREAST CANCER, LEFT (HCC): Status: ACTIVE | Noted: 2024-01-26

## 2024-01-26 RX ORDER — IBUPROFEN 800 MG/1
TABLET ORAL
COMMUNITY
Start: 2023-12-19

## 2024-01-26 RX ORDER — PSEUDOEPHED/ACETAMINOPH/DIPHEN 30MG-500MG
TABLET ORAL
COMMUNITY
Start: 2023-12-19

## 2024-01-26 NOTE — H&P
Department of Plastic Surgery - Adult  Attending Consult Note              CHIEF COMPLAINT:  History of left Breast Cancer     History Obtained From:  patient     HISTORY OF PRESENT ILLNESS:                 The patient is a 43 y.o. female who presents with History of left breast cancer.  Patient had a lumpectomy to the left breast mound with subsequent radiation therapy which she completed in May 2023.  She presents her office today at the referral of her breast surgeon for options on breast symmetry.  She states that she has noticed her right breast is larger and more ptotic when compared to the left.         Past Medical History:    Past Medical History        Past Medical History:   Diagnosis Date    Abnormal Pap smear       repeat was normal    Breast cancer (HCC)      Cancer (HCC) 2022     breast    Diabetes mellitus (HCC) 2014    History of therapeutic radiation      Sickle cell trait (HCC)           Past Surgical History:    Past Surgical History         Past Surgical History:   Procedure Laterality Date    BREAST BIOPSY        BREAST BIOPSY Left 2023     Left breast bracketed 2 mag seeds localized lumpectomy left axillary sentinel lymph node biopsy, performed by Jassi Mendoza MD at [facility] OR     SECTION        LAPAROSCOPY        [facility] BREAST BIOPSY LEFT Left 2022     [facility] BREAST BIOPSY LEFT 2022 [facility]         Current Medications:   Current Hospital Medications   No current facility-administered medications for this visit.     Allergies:  Patient has no known allergies.     Social History:   Social History               Socioeconomic History    Marital status: Legally        Spouse name: Not on file    Number of children: Not on file    Years of education: Not on file    Highest education level: Not on file   Occupational History    Not on file   Tobacco Use    Smoking status: Never    Smokeless tobacco: Never   Vaping Use    Vaping  apparent distress, and appears stated age  EYES: PERRLA, EOMI, no signs of occular infection  MUSCULOSKELETAL: negative for flaccid muscle tone or spastic movements.  NEURO: Cranial nerves II-XII grossly intact. No signs of agitated mood.   LUNGS:  No increased work of breathing, good air exchange, clear to auscultation bilaterally, no crackles or wheezing  CARDIOVASCULAR:  Normal apical impulse, regular rate and rhythm,   ABDOMEN:  Normal bowel sounds, soft, non-distended, non-tender,      LEFT BREAST: Rash is not noted, There are no masses palpated, no axillary lymphadenopathy, no nipple discharge. No breast pain. There are  previous scars-moderate radiation therapy changes to the skin     RIGHT BREAST: Rash is not noted, There are no masses palpated, no axillary lymphadenopathy, no nipple discharge. No breast pain. There are no previous scars-ptosis of the right breast noted more caudally when compared to the left breast mound        DATA:    Radiology Review:  EXAMINATION:  SCREENING DIGITAL BILATERAL MAMMOGRAM WITH TOMOSYNTHESIS, 9/18/2023     TECHNIQUE:  Screening mammography of the bilateral breasts was performed with  tomosynthesis.  2D standard and 3D tomosynthesis combination imaging  performed through both breasts in the MLO and CC projection.  Computer aided  detection was utilized in the interpretation of this exam.     COMPARISON:  Multiple prior studies, the most recent dated February 7, 2023     HISTORY:  Screening. TC score of 100%.     FINDINGS:  Breast tissue is extremely dense which limits the sensitivity of mammography.  No suspicious mass, microcalcifications, or architectural distortion  identified in either breast.     IMPRESSION:  No mammographic evidence of malignancy in either breast.     RECOMMENDATION:     Annual bilateral mammogram is advised with consideration for annual bilateral  screening ultrasound given the patient's breast density.     BIRADS:  BIRADS - CATEGORY 1     Breast

## 2024-01-26 NOTE — PROGRESS NOTES
[facility]   PRE-ADMISSION TESTING GENERAL INSTRUCTIONS  PAT Phone Number: [phone]      GENERAL INSTRUCTIONS:  She will finish her night shift and come to pre-op,  i explained she can shower there.  Instructed to bring her change of clothes for going home.    [x] Antibacterial Soap shower Night before and/or AM of Surgery  [] CHG wipe instruction sheet and wipes given.  []Hibiclens shower the night before and the morning of surgery. Do not use Hibiclens on your face or head.   [x] Nothing by mouth after midnight, including gum, candy, mints, or water.  Only a sip of water the medications we instruct you to take.  [x] You may brush your teeth, gargle, but do NOT swallow water.   [x] No smoking, chewing tobacco, illegal drugs, or alcohol within 24 hours of your surgery.  [x] Jewelry, valuables or body piercing's should not be brought to the hospital. All body and/or tongue piercing's must be removed prior to arriving to hospital.  ALL hair pins must be removed.   [x] Do not wear makeup, lotions, powders, deodorant.  Nail polish as directed by the nurse.  [x] Arrange transportation with a responsible adult  to and from the hospital.  Arrange for someone to be with you for the remainder of the day and for 24 hours after your procedure due to having had anesthesia.        Who will be your  for transportation?___debbie, friend_______________       Who will be staying with you for 24 hrs after your procedure?__spouse and children________________  [x] Only 2 ADULTS are permitted to accompany you.    [x] Bring insurance card and photo ID.  [] Transfusion Bracelet ([place]): Please bring with you to hospital, day of surgery  [x] Urine pregnancy test will be done in pre-op.  Bring urine specimen day of surgery. Any small container is acceptable.  [] Bring copy of living will or healthcare power of  papers to be placed in your electronic record.    PARKING  information sheet(s)   [] Fluoroscopy-Xray used in surgery reviewed with patient.Educational pamphlet placed in chart.    [x] Pain: Post-op pain is normal and to be expected.  You will be asked to rate your pain from 0-10(a zero is not acceptable-education is needed).  [x] Ask your nurse for your pain medication.  [] Joint camp offered.   [] Joint replacement booklets given.  [x] Other:_wear loose comfortable clothing.__________________________    WHAT TO EXPECT:  [x] The day of surgery you will be greeted and [facility] .  In addition, you will be registered in the [facility] by a Patient Access Representative.  A nurse will greet you in accordance to the time you are needed in the pre-op area to prepare you for surgery. Please do not be discouraged if you are not greeted in the order you arrive as there are many variables that are involved in patient preparation.  Your patience is greatly appreciated as you wait for your nurse.   [x]  Delays may occur with surgery and staff will make a sincere effort to keep you informed of delays.  If any delays occur with your procedure, we apologize ahead of time for your inconvenience as we recognize the value of your time.

## 2024-01-29 ENCOUNTER — HOSPITAL ENCOUNTER (OUTPATIENT)
Age: 44
Discharge: HOME | End: 2024-01-29
Payer: MEDICAID

## 2024-01-29 LAB
EKG ATRIAL RATE: 99 BPM
EKG P AXIS: 68 DEGREES
EKG P-R INTERVAL: 160 MS
EKG Q-T INTERVAL: 354 MS
EKG QRS DURATION: 72 MS
EKG QTC CALCULATION (BAZETT): 454 MS
EKG R AXIS: 81 DEGREES
EKG T AXIS: 46 DEGREES
EKG VENTRICULAR RATE: 99 BPM

## 2024-01-29 PROCEDURE — 93005 ELECTROCARDIOGRAM TRACING: CPT | Performed by: PHYSICIAN ASSISTANT

## 2024-01-29 PROCEDURE — 93010 ELECTROCARDIOGRAM REPORT: CPT | Performed by: INTERNAL MEDICINE

## 2024-02-01 ENCOUNTER — ANESTHESIA EVENT (OUTPATIENT)
Dept: OPERATING ROOM | Age: 44
End: 2024-02-01
Payer: MEDICAID

## 2024-02-01 ENCOUNTER — ANESTHESIA (OUTPATIENT)
Dept: OPERATING ROOM | Age: 44
End: 2024-02-01
Payer: MEDICAID

## 2024-02-01 ENCOUNTER — HOSPITAL ENCOUNTER (OUTPATIENT)
Age: 44
Setting detail: OUTPATIENT SURGERY
Discharge: HOME | End: 2024-02-01
Attending: PLASTIC SURGERY | Admitting: PLASTIC SURGERY
Payer: MEDICAID

## 2024-02-01 VITALS
DIASTOLIC BLOOD PRESSURE: 78 MMHG | HEIGHT: 61 IN | OXYGEN SATURATION: 98 % | HEART RATE: 90 BPM | SYSTOLIC BLOOD PRESSURE: 120 MMHG | WEIGHT: 109 LBS | TEMPERATURE: 96.8 F | RESPIRATION RATE: 19 BRPM | BODY MASS INDEX: 20.58 KG/M2

## 2024-02-01 DIAGNOSIS — C50.912 BREAST CANCER, LEFT (HCC): ICD-10-CM

## 2024-02-01 DIAGNOSIS — G89.18 POST-OP PAIN: ICD-10-CM

## 2024-02-01 DIAGNOSIS — Z01.818 PRE-OP TESTING: Primary | ICD-10-CM

## 2024-02-01 LAB
ANION GAP SERPL CALCULATED.3IONS-SCNC: 12 MMOL/L (ref 7–16)
BUN SERPL-MCNC: 10 MG/DL (ref 6–20)
CALCIUM SERPL-MCNC: 9.3 MG/DL (ref 8.6–10.2)
CHLORIDE SERPL-SCNC: 103 MMOL/L (ref 98–107)
CO2 SERPL-SCNC: 25 MMOL/L (ref 22–29)
CREAT SERPL-MCNC: 0.6 MG/DL (ref 0.5–1)
ERYTHROCYTE [DISTWIDTH] IN BLOOD BY AUTOMATED COUNT: 18.3 % (ref 11.5–15)
GFR SERPL CREATININE-BSD FRML MDRD: >60 ML/MIN/1.73M2
GLUCOSE BLD-MCNC: 129 MG/DL (ref 74–99)
GLUCOSE SERPL-MCNC: 125 MG/DL (ref 74–99)
HCG, URINE, POC: NEGATIVE
HCT VFR BLD AUTO: 32.8 % (ref 34–48)
HGB BLD-MCNC: 10.1 G/DL (ref 11.5–15.5)
Lab: NORMAL
MCH RBC QN AUTO: 24 PG (ref 26–35)
MCHC RBC AUTO-ENTMCNC: 30.8 G/DL (ref 32–34.5)
MCV RBC AUTO: 78.1 FL (ref 80–99.9)
NEGATIVE QC PASS/FAIL: NORMAL
PLATELET # BLD AUTO: 398 K/UL (ref 130–450)
PMV BLD AUTO: 8.9 FL (ref 7–12)
POSITIVE QC PASS/FAIL: NORMAL
POTASSIUM SERPL-SCNC: 3.5 MMOL/L (ref 3.5–5)
RBC # BLD AUTO: 4.2 M/UL (ref 3.5–5.5)
SODIUM SERPL-SCNC: 140 MMOL/L (ref 132–146)
WBC OTHER # BLD: 7.2 K/UL (ref 4.5–11.5)

## 2024-02-01 PROCEDURE — 88305 TISSUE EXAM BY PATHOLOGIST: CPT

## 2024-02-01 PROCEDURE — 6370000000 HC RX 637 (ALT 250 FOR IP): Performed by: STUDENT IN AN ORGANIZED HEALTH CARE EDUCATION/TRAINING PROGRAM

## 2024-02-01 PROCEDURE — A4217 STERILE WATER/SALINE, 500 ML: HCPCS | Performed by: PLASTIC SURGERY

## 2024-02-01 PROCEDURE — 7100000011 HC PHASE II RECOVERY - ADDTL 15 MIN: Performed by: PLASTIC SURGERY

## 2024-02-01 PROCEDURE — 19316 MASTOPEXY: CPT | Performed by: PLASTIC SURGERY

## 2024-02-01 PROCEDURE — 85027 COMPLETE CBC AUTOMATED: CPT

## 2024-02-01 PROCEDURE — 7100000000 HC PACU RECOVERY - FIRST 15 MIN: Performed by: PLASTIC SURGERY

## 2024-02-01 PROCEDURE — 2500000003 HC RX 250 WO HCPCS

## 2024-02-01 PROCEDURE — 6360000002 HC RX W HCPCS: Performed by: PLASTIC SURGERY

## 2024-02-01 PROCEDURE — 2580000003 HC RX 258: Performed by: PHYSICIAN ASSISTANT

## 2024-02-01 PROCEDURE — 3700000000 HC ANESTHESIA ATTENDED CARE: Performed by: PLASTIC SURGERY

## 2024-02-01 PROCEDURE — 2500000003 HC RX 250 WO HCPCS: Performed by: ANESTHESIOLOGIST ASSISTANT

## 2024-02-01 PROCEDURE — 6360000002 HC RX W HCPCS: Performed by: STUDENT IN AN ORGANIZED HEALTH CARE EDUCATION/TRAINING PROGRAM

## 2024-02-01 PROCEDURE — C1789 PROSTHESIS, BREAST, IMP: HCPCS | Performed by: PLASTIC SURGERY

## 2024-02-01 PROCEDURE — 2500000003 HC RX 250 WO HCPCS: Performed by: PLASTIC SURGERY

## 2024-02-01 PROCEDURE — 19325 BREAST AUGMENTATION W/IMPLT: CPT | Performed by: PLASTIC SURGERY

## 2024-02-01 PROCEDURE — 2580000003 HC RX 258: Performed by: PLASTIC SURGERY

## 2024-02-01 PROCEDURE — 2720000010 HC SURG SUPPLY STERILE: Performed by: PLASTIC SURGERY

## 2024-02-01 PROCEDURE — 82962 GLUCOSE BLOOD TEST: CPT

## 2024-02-01 PROCEDURE — 2580000003 HC RX 258: Performed by: STUDENT IN AN ORGANIZED HEALTH CARE EDUCATION/TRAINING PROGRAM

## 2024-02-01 PROCEDURE — 7100000010 HC PHASE II RECOVERY - FIRST 15 MIN: Performed by: PLASTIC SURGERY

## 2024-02-01 PROCEDURE — 80048 BASIC METABOLIC PNL TOTAL CA: CPT

## 2024-02-01 PROCEDURE — 7100000001 HC PACU RECOVERY - ADDTL 15 MIN: Performed by: PLASTIC SURGERY

## 2024-02-01 PROCEDURE — 2709999900 HC NON-CHARGEABLE SUPPLY: Performed by: PLASTIC SURGERY

## 2024-02-01 PROCEDURE — 3600000017 HC SURGERY HYBRID ADDL 15MIN: Performed by: PLASTIC SURGERY

## 2024-02-01 PROCEDURE — 76942 ECHO GUIDE FOR BIOPSY: CPT | Performed by: STUDENT IN AN ORGANIZED HEALTH CARE EDUCATION/TRAINING PROGRAM

## 2024-02-01 PROCEDURE — 3600000007 HC SURGERY HYBRID BASE: Performed by: PLASTIC SURGERY

## 2024-02-01 PROCEDURE — 6360000002 HC RX W HCPCS: Performed by: PHYSICIAN ASSISTANT

## 2024-02-01 PROCEDURE — 6360000002 HC RX W HCPCS: Performed by: ANESTHESIOLOGIST ASSISTANT

## 2024-02-01 PROCEDURE — 3700000001 HC ADD 15 MINUTES (ANESTHESIA): Performed by: PLASTIC SURGERY

## 2024-02-01 DEVICE — SMOOTH ROUND MODERATE PLUS PROFILE GEL-FILLED BREAST IMPLANT, 100CC  SMOOTH ROUND SILICONE
Type: IMPLANTABLE DEVICE | Site: BREAST | Status: FUNCTIONAL
Brand: MENTOR MEMORYGEL BREAST IMPLANT

## 2024-02-01 RX ORDER — LIDOCAINE HYDROCHLORIDE AND EPINEPHRINE 10; 10 MG/ML; UG/ML
INJECTION, SOLUTION INFILTRATION; PERINEURAL PRN
Status: DISCONTINUED | OUTPATIENT
Start: 2024-02-01 | End: 2024-02-01 | Stop reason: ALTCHOICE

## 2024-02-01 RX ORDER — CEPHALEXIN 500 MG/1
500 CAPSULE ORAL 4 TIMES DAILY
Qty: 20 CAPSULE | Refills: 0 | Status: SHIPPED | OUTPATIENT
Start: 2024-02-01 | End: 2024-02-06

## 2024-02-01 RX ORDER — DEXAMETHASONE SODIUM PHOSPHATE 10 MG/ML
INJECTION INTRAMUSCULAR; INTRAVENOUS PRN
Status: DISCONTINUED | OUTPATIENT
Start: 2024-02-01 | End: 2024-02-01 | Stop reason: SDUPTHER

## 2024-02-01 RX ORDER — OXYCODONE HYDROCHLORIDE AND ACETAMINOPHEN 5; 325 MG/1; MG/1
1 TABLET ORAL EVERY 6 HOURS PRN
Qty: 15 TABLET | Refills: 0 | Status: SHIPPED | OUTPATIENT
Start: 2024-02-01 | End: 2024-02-08

## 2024-02-01 RX ORDER — SODIUM CHLORIDE 0.9 % (FLUSH) 0.9 %
5-40 SYRINGE (ML) INJECTION PRN
Status: DISCONTINUED | OUTPATIENT
Start: 2024-02-01 | End: 2024-02-01 | Stop reason: HOSPADM

## 2024-02-01 RX ORDER — HYDROMORPHONE HYDROCHLORIDE 1 MG/ML
0.5 INJECTION, SOLUTION INTRAMUSCULAR; INTRAVENOUS; SUBCUTANEOUS EVERY 5 MIN PRN
Status: DISCONTINUED | OUTPATIENT
Start: 2024-02-01 | End: 2024-02-01 | Stop reason: HOSPADM

## 2024-02-01 RX ORDER — SODIUM CHLORIDE 0.9 % (FLUSH) 0.9 %
5-40 SYRINGE (ML) INJECTION EVERY 12 HOURS SCHEDULED
Status: DISCONTINUED | OUTPATIENT
Start: 2024-02-01 | End: 2024-02-01 | Stop reason: HOSPADM

## 2024-02-01 RX ORDER — PROMETHAZINE HYDROCHLORIDE 25 MG/1
25 SUPPOSITORY RECTAL EVERY 6 HOURS PRN
Qty: 4 SUPPOSITORY | Refills: 0 | Status: SHIPPED | OUTPATIENT
Start: 2024-02-01 | End: 2024-02-08

## 2024-02-01 RX ORDER — SODIUM CHLORIDE 9 MG/ML
INJECTION, SOLUTION INTRAVENOUS PRN
Status: DISCONTINUED | OUTPATIENT
Start: 2024-02-01 | End: 2024-02-01 | Stop reason: HOSPADM

## 2024-02-01 RX ORDER — MIDAZOLAM HYDROCHLORIDE 1 MG/ML
INJECTION INTRAMUSCULAR; INTRAVENOUS PRN
Status: DISCONTINUED | OUTPATIENT
Start: 2024-02-01 | End: 2024-02-01 | Stop reason: SDUPTHER

## 2024-02-01 RX ORDER — ONDANSETRON 2 MG/ML
INJECTION INTRAMUSCULAR; INTRAVENOUS PRN
Status: DISCONTINUED | OUTPATIENT
Start: 2024-02-01 | End: 2024-02-01 | Stop reason: SDUPTHER

## 2024-02-01 RX ORDER — FENTANYL CITRATE 50 UG/ML
INJECTION, SOLUTION INTRAMUSCULAR; INTRAVENOUS PRN
Status: DISCONTINUED | OUTPATIENT
Start: 2024-02-01 | End: 2024-02-01 | Stop reason: SDUPTHER

## 2024-02-01 RX ORDER — ROPIVACAINE HYDROCHLORIDE 5 MG/ML
INJECTION, SOLUTION EPIDURAL; INFILTRATION; PERINEURAL
Status: DISCONTINUED
Start: 2024-02-01 | End: 2024-02-01 | Stop reason: HOSPADM

## 2024-02-01 RX ORDER — ROPIVACAINE HYDROCHLORIDE 5 MG/ML
30 INJECTION, SOLUTION EPIDURAL; INFILTRATION; PERINEURAL ONCE
Status: DISCONTINUED | OUTPATIENT
Start: 2024-02-01 | End: 2024-02-01 | Stop reason: HOSPADM

## 2024-02-01 RX ORDER — LIDOCAINE HYDROCHLORIDE 20 MG/ML
INJECTION, SOLUTION INTRAVENOUS PRN
Status: DISCONTINUED | OUTPATIENT
Start: 2024-02-01 | End: 2024-02-01 | Stop reason: SDUPTHER

## 2024-02-01 RX ORDER — ROCURONIUM BROMIDE 10 MG/ML
INJECTION, SOLUTION INTRAVENOUS PRN
Status: DISCONTINUED | OUTPATIENT
Start: 2024-02-01 | End: 2024-02-01 | Stop reason: SDUPTHER

## 2024-02-01 RX ORDER — PROCHLORPERAZINE EDISYLATE 5 MG/ML
5 INJECTION INTRAMUSCULAR; INTRAVENOUS
Status: DISCONTINUED | OUTPATIENT
Start: 2024-02-01 | End: 2024-02-01 | Stop reason: HOSPADM

## 2024-02-01 RX ORDER — SCOLOPAMINE TRANSDERMAL SYSTEM 1 MG/1
1 PATCH, EXTENDED RELEASE TRANSDERMAL
Status: DISCONTINUED | OUTPATIENT
Start: 2024-02-01 | End: 2024-02-01 | Stop reason: HOSPADM

## 2024-02-01 RX ORDER — ONDANSETRON 4 MG/1
4 TABLET, FILM COATED ORAL DAILY PRN
Qty: 12 TABLET | Refills: 1 | Status: SHIPPED | OUTPATIENT
Start: 2024-02-01

## 2024-02-01 RX ORDER — HYDROMORPHONE HYDROCHLORIDE 1 MG/ML
0.25 INJECTION, SOLUTION INTRAMUSCULAR; INTRAVENOUS; SUBCUTANEOUS EVERY 5 MIN PRN
Status: DISCONTINUED | OUTPATIENT
Start: 2024-02-01 | End: 2024-02-01 | Stop reason: HOSPADM

## 2024-02-01 RX ORDER — PROPOFOL 10 MG/ML
INJECTION, EMULSION INTRAVENOUS PRN
Status: DISCONTINUED | OUTPATIENT
Start: 2024-02-01 | End: 2024-02-01 | Stop reason: SDUPTHER

## 2024-02-01 RX ORDER — SODIUM CHLORIDE 9 MG/ML
INJECTION, SOLUTION INTRAVENOUS CONTINUOUS
Status: DISCONTINUED | OUTPATIENT
Start: 2024-02-01 | End: 2024-02-01 | Stop reason: HOSPADM

## 2024-02-01 RX ORDER — PROPOFOL 10 MG/ML
INJECTION, EMULSION INTRAVENOUS CONTINUOUS PRN
Status: DISCONTINUED | OUTPATIENT
Start: 2024-02-01 | End: 2024-02-01 | Stop reason: SDUPTHER

## 2024-02-01 RX ADMIN — ROCURONIUM BROMIDE 40 MG: 10 INJECTION, SOLUTION INTRAVENOUS at 09:52

## 2024-02-01 RX ADMIN — CEFAZOLIN 2000 MG: 2 INJECTION, POWDER, FOR SOLUTION INTRAMUSCULAR; INTRAVENOUS at 09:57

## 2024-02-01 RX ADMIN — ONDANSETRON HYDROCHLORIDE 4 MG: 2 SOLUTION INTRAMUSCULAR; INTRAVENOUS at 10:50

## 2024-02-01 RX ADMIN — SUGAMMADEX 101 MG: 100 INJECTION, SOLUTION INTRAVENOUS at 10:43

## 2024-02-01 RX ADMIN — DEXAMETHASONE SODIUM PHOSPHATE 10 MG: 10 INJECTION INTRAMUSCULAR; INTRAVENOUS at 09:57

## 2024-02-01 RX ADMIN — SODIUM CHLORIDE: 9 INJECTION, SOLUTION INTRAVENOUS at 08:25

## 2024-02-01 RX ADMIN — PROPOFOL 150 MCG/KG/MIN: 10 INJECTION, EMULSION INTRAVENOUS at 10:00

## 2024-02-01 RX ADMIN — SODIUM CHLORIDE: 9 INJECTION, SOLUTION INTRAVENOUS at 11:06

## 2024-02-01 RX ADMIN — ROPIVACAINE HYDROCHLORIDE 30 ML: 5 INJECTION, SOLUTION EPIDURAL; INFILTRATION; PERINEURAL at 10:50

## 2024-02-01 RX ADMIN — PROPOFOL 100 MG: 10 INJECTION, EMULSION INTRAVENOUS at 09:52

## 2024-02-01 RX ADMIN — MIDAZOLAM 2 MG: 1 INJECTION INTRAMUSCULAR; INTRAVENOUS at 09:46

## 2024-02-01 RX ADMIN — HYDROMORPHONE HYDROCHLORIDE 0.5 MG: 1 INJECTION, SOLUTION INTRAMUSCULAR; INTRAVENOUS; SUBCUTANEOUS at 11:43

## 2024-02-01 RX ADMIN — FENTANYL CITRATE 50 MCG: 50 INJECTION, SOLUTION INTRAMUSCULAR; INTRAVENOUS at 09:52

## 2024-02-01 RX ADMIN — FENTANYL CITRATE 50 MCG: 50 INJECTION, SOLUTION INTRAMUSCULAR; INTRAVENOUS at 10:15

## 2024-02-01 RX ADMIN — LIDOCAINE HYDROCHLORIDE 60 MG: 20 INJECTION, SOLUTION INTRAVENOUS at 09:52

## 2024-02-01 ASSESSMENT — PAIN DESCRIPTION - LOCATION: LOCATION: BREAST

## 2024-02-01 ASSESSMENT — PAIN DESCRIPTION - FREQUENCY: FREQUENCY: INTERMITTENT

## 2024-02-01 ASSESSMENT — PAIN DESCRIPTION - DESCRIPTORS: DESCRIPTORS: THROBBING

## 2024-02-01 ASSESSMENT — PAIN DESCRIPTION - PAIN TYPE: TYPE: SURGICAL PAIN

## 2024-02-01 ASSESSMENT — PAIN DESCRIPTION - ORIENTATION: ORIENTATION: RIGHT

## 2024-02-01 ASSESSMENT — PAIN - FUNCTIONAL ASSESSMENT
PAIN_FUNCTIONAL_ASSESSMENT: 0-10
PAIN_FUNCTIONAL_ASSESSMENT: ADULT NONVERBAL PAIN SCALE (NPVS)

## 2024-02-01 ASSESSMENT — PAIN DESCRIPTION - ONSET: ONSET: AWAKENED FROM SLEEP

## 2024-02-01 ASSESSMENT — PAIN SCALES - GENERAL: PAINLEVEL_OUTOF10: 7

## 2024-02-01 NOTE — ANESTHESIA PROCEDURE NOTES
Peripheral Block    Patient location during procedure: OR  Reason for block: post-op pain management and at surgeon's request  Start time: 2/1/2024 10:50 AM  End time: 2/1/2024 10:53 AM  Staffing  Performed by: Alma Tracy MD  Authorized by: Alma Tracy MD    Preanesthetic Checklist  Completed: patient identified, IV checked, site marked, risks and benefits discussed, surgical/procedural consents, equipment checked, pre-op evaluation, timeout performed, anesthesia consent given, oxygen available and monitors applied/VS acknowledged  Peripheral Block   Patient position: supine  Prep: ChloraPrep  Provider prep: mask and sterile gloves  Patient monitoring: cardiac monitor, continuous pulse ox, frequent blood pressure checks and IV access  Block type: PECS I and PECS II  Laterality: right  Injection technique: single-shot  Guidance: ultrasound guided  Local infiltration: lidocaine  Infiltration strength: 1 %  Local infiltration: lidocaine    Needle   Needle type: combined needle/nerve stimulator   Needle gauge: 20 G  Needle localization: ultrasound guidance  Needle length: 10 cm  Assessment   Injection assessment: negative aspiration for heme, no paresthesia on injection and local visualized surrounding nerve on ultrasound  Slow fractionated injection: yes  Hemodynamics: stable  Real-time US image taken/store: yes    Additional Notes      Medications Administered  ROPivacaine (NAROPIN) 0.25% in sodium chloride (Mixture components: ROPivacaine 0.5% Soln, 10 mL; sodium chloride 0.9 % Soln, 10 mL) - Perineural   30 mL - 2/1/2024 10:50:00 AM

## 2024-02-01 NOTE — PROGRESS NOTES
Patient sitting up in bed taking PO.  Patient's belongings returned. Patient is calling a friend for a ride home.

## 2024-02-01 NOTE — ANESTHESIA POSTPROCEDURE EVALUATION
Department of Anesthesiology  Postprocedure Note    Patient: Krystle Alves  MRN: [id number]  YOB: 1980  Date of evaluation: 2/1/2024    Procedure Summary       Date: 02/01/24 Room / Location: [address]    Anesthesia Start: 0946 Anesthesia Stop: 1106    Procedure: MASTOPEXY, Silicone breast augmentation right breast (Right: Breast) Diagnosis:       Breast cancer, left (HCC)      (Breast cancer, left (HCC) [C50.912])    Surgeons: Jonas Wooten MD Responsible Provider: Alma Tracy MD    Anesthesia Type: general, regional ASA Status: 3            Anesthesia Type: No value filed.    Santiago Phase I: Santiago Score: 10    Santiago Phase II: Santiago Score: 10    Anesthesia Post Evaluation    Patient location during evaluation: PACU  Patient participation: complete - patient participated  Level of consciousness: awake  Airway patency: patent  Nausea & Vomiting: no nausea and no vomiting  Cardiovascular status: hemodynamically stable  Respiratory status: acceptable  Hydration status: euvolemic  Pain management: adequate    No notable events documented.

## 2024-02-01 NOTE — PROGRESS NOTES
Patient signed out from SRDA phase of care. Patient waiting for a ride that will be available after 2. Patient moved to a space with a TV.

## 2024-02-01 NOTE — DISCHARGE INSTRUCTIONS
Discharge Home.   Call office to schedule a follow-up appointment at [phone]  Please call to schedule an appointment to be seen In our office on Thursday 2-8-24  Diet: regular diet  Activity: ambulate in house and no Strenuous exercise for 1 week    Shower/Bathing:  You can shower in 48 hours over the incision sites.  At that time you can allow soap and water to rinse off the incision sites while showering.  Once you are done in the shower you can pat dry the incision sites with a clean paper towel.  No baths, hot tubs or soaking of the wound site at this time.  It is okay for the Steri-Strips to become wet in the shower.  These can be dried with a clean paper towel.    Dressings /Wound Care:Keep wound clean and dry.  There is no need to remove your steri strips.  Your surgical bra needs to be worn at all times.  Your bra may become saturated with drainage this is to be expected.  The bra can be off for period of time to have it washed and dried.  You can use gauze padding as needed for comfort under the surgical bra.  This gauze dressing can be changed as needed if the gauze becomes saturated.  Your surgical bra should be worn at all times for aiding in compression to the bilateral breasts.  The surgical bra should be snug fitting at all times.  At any point during the day if you notice any sudden changes to the appearance of the wound or the site operated on you will need to contact our office.  This may include opening of wound, pus, changes in size, color etc.    Things you need to call your doctor for:  Changes in the symmetry or size of your breast.  If one of your breast becomes larger than the other after surgery this is something that you need to call our office for.  You will also need to call our office if you note any changes in the color or consistency of the nipple and areola.  Drainage is to be expected around the nipple and areola but if changes to the nipple itself occur please contact our

## 2024-02-01 NOTE — OP NOTE
Operative Note      Patient: Krystle Alves  YOB: 1980  MRN: [id number]    Date of Procedure: 2/1/2024    Pre-Op Diagnosis Codes:     * Breast cancer, left (HCC) [C50.912]    Post-Op Diagnosis: Same       Procedure(s):  MASTOPEXY, Silicone breast augmentation right breast    Surgeon(s):  Jonas Wooten MD    Assistant:   Physician Assistant: Tim Crawford PA  Resident: Stevie Alford DO    Anesthesia: General    Estimated Blood Loss (mL): Minimal    Complications: None    Specimens:   ID Type Source Tests Collected by Time Destination   A : RIGHT BREAST SKIN Tissue Tissue SURGICAL PATHOLOGY Jonas Wooten MD 2/1/2024 1021        Implants:  Implant Name Type Inv. Item Serial No.  Lot No. LRB No. Used Action   IMPLANT BRST 100CC DIA8.2CM P2.7CM JESSY GEL SMOOTH RND MOD + - [id number]  IMPLANT BRST 100CC DIA8.2CM P2.7CM JESSY GEL SMOOTH RND MOD + [id number] [employer]WD  Right 1 Implanted         Drains: * No LDAs found *          Detailed Description of Procedure:         ASSISTANT:  Tim Crawford PA-C.  PA was required for the case due  to lack of adequately trained assistant; was involved in prepping,  draping, retracting, dressing and suturing.           PREOPERATIVE INDICATIONS:  The patient is a 43 y.o. female with history of left-sided breast cancer following lumpectomy and radiation.  Patient has significant asymmetry with ptosis on the right side as well as a smaller parenchyma of the right side as well.  The patient elected to proceed with a matching right mastopexy and matching breast augmentation with a small silicone implant to stearns more symmetry.  Risks, benefits and alternatives were explained to the patient including but not limited to bleeding, scarring, infection, death, and need for further surgery.  The patient understood and elected to proceed.  They were seen on the day of surgery, marked and all questions were answered.  The patient was

## 2024-02-01 NOTE — ANESTHESIA PRE PROCEDURE
Department of Anesthesiology  Preprocedure Note       Name:  Krystle Alves   Age:  43 y.o.  :  1980                                          MRN:  [id number]         Date:  2024      Surgeon: Surgeon(s):  Jonas Wooten MD    Procedure: Procedure(s):  MASTOPEXY  possible of silicone breast augmentation right breast    Medications prior to admission:   Prior to Admission medications    Medication Sig Start Date End Date Taking? Authorizing Provider   Acetaminophen Extra Strength 500 MG TABS  23  Yes Provider, MD Sandor   ibuprofen (ADVIL;MOTRIN) 800 MG tablet  23  Yes Provider, MD Sandor   tamoxifen (NOLVADEX) 20 MG tablet Take 1 tablet by mouth daily 23  Amira Workman APRN   blood glucose monitor kit and supplies Dispense sufficient amount for indicated testing frequency plus additional to accommodate PRN testing needs. Dispense all needed supplies to include: monitor, strips, lancing device, lancets, control solutions, alcohol swabs. 10/20/22   Susan Rose MD   metFORMIN (GLUCOPHAGE) 1000 MG tablet Take 1 tablet by mouth 2 times daily (with meals) 10/20/22   Susan Rose MD       Current medications:    Current Facility-Administered Medications   Medication Dose Route Frequency Provider Last Rate Last Admin    sodium chloride flush 0.9 % injection 5-40 mL  5-40 mL IntraVENous 2 times per day Tim Crawford PA        sodium chloride flush 0.9 % injection 5-40 mL  5-40 mL IntraVENous PRN Tim Crawford PA        0.9 % sodium chloride infusion   IntraVENous PRN Tim Crawford PA        0.9 % sodium chloride infusion   IntraVENous Continuous Tim Crawford  mL/hr at 24 0825 New Bag at 24 0825    ceFAZolin (ANCEF) 2,000 mg in sterile water 20 mL IV syringe  2,000 mg IntraVENous On Call to OR Tim Crawford PA           Allergies:  No Known Allergies    Problem List:    Patient Active

## 2024-02-05 LAB — SURGICAL PATHOLOGY REPORT: NORMAL

## 2024-02-08 ENCOUNTER — OFFICE VISIT (OUTPATIENT)
Dept: SURGERY | Age: 44
End: 2024-02-08

## 2024-02-08 VITALS — TEMPERATURE: 98.2 F

## 2024-02-08 DIAGNOSIS — Z98.890 S/P BREAST RECONSTRUCTION: ICD-10-CM

## 2024-02-08 DIAGNOSIS — N64.89 BREAST ASYMMETRY: Primary | ICD-10-CM

## 2024-02-08 PROCEDURE — 99024 POSTOP FOLLOW-UP VISIT: CPT | Performed by: PHYSICIAN ASSISTANT

## 2024-02-08 NOTE — PROGRESS NOTES
Subjective:    Follow up today from MASTOPEXY, Silicone breast augmentation right breast - Right 2/1/2024 . Denies fever, nausea, vomiting, leg pain or swelling, pain is abasent.  The pt states she is  taking her antibiotic and continues to wear her surgical bra.    She is ambulating at home.  She is  wearing her surgical bra at all times.    Objective:    Temp 98.2 °F (36.8 °C) (Infrared)   LMP 01/01/2024 (Exact Date)           Right Breast Clean, dry, and intact, no drainage. Steri strips intact, no signs of infection.         Assessment:    Patient Active Problem List   Diagnosis    Other congenital or acquired abnormality of cervix, antepartum condition or complication    Sickle-cell trait (HCC)    Malignant neoplasm of left breast (HCC)    Breast cancer, left (HCC)    Pre-op testing       Plan:     Status Post : MASTOPEXY, Silicone breast augmentation right breast - Right 2/1/2024      -Continue wearing surgical bra at all times  -Educated the pt that her pain is proportionate to the amount of surgery she had and she may begin using Ibuprofen in addition to pain medication.    -ABX to completion  -Pain control PRN  -No driving while taking narcotic pain medication or while impaired second to limited UE  ROM  -No heavy lifting at this time  -OK to shower at this time.  Advised the patient that they can allow soap and water to rinse of the incision site while showering.  Once they are done in the shower they are to pat dry the incision site with a clean paper towel.  No baths, hot tubs or soaking of the wound site at this time.  Pt voices understanding.       F/U in 2 weeks.    Call office with concerns or signs of infection.      APOLINAR Frias   9:40 AM  2/8/2024

## 2024-02-23 ENCOUNTER — OFFICE VISIT (OUTPATIENT)
Dept: SURGERY | Age: 44
End: 2024-02-23

## 2024-02-23 VITALS — HEART RATE: 87 BPM | OXYGEN SATURATION: 97 % | TEMPERATURE: 97.4 F

## 2024-02-23 DIAGNOSIS — Z98.890 S/P BREAST RECONSTRUCTION: Primary | ICD-10-CM

## 2024-02-23 PROCEDURE — 99024 POSTOP FOLLOW-UP VISIT: CPT | Performed by: PHYSICIAN ASSISTANT

## 2024-02-23 NOTE — PROGRESS NOTES
Subjective:    Follow up today from MASTOPEXY, Silicone breast augmentation right breast - Right 2/1/2024 . Denies fever, nausea, vomiting, leg pain or swelling, pain is abasent.  She presents today for continued wound examination to the right breast   she is ambulating at home.  She is  wearing her surgical bra at all times.   She voices no complaints at this time  Objective:    Pulse 87   Temp 97.4 °F (36.3 °C) (Infrared)   LMP 01/01/2024 (Exact Date)   SpO2 97%           Right Breast Clean, dry, and intact, no drainage. Steri strips intact, no signs of infection.  After removal of the Steri-Strips incision are clean dry and intact no fluid wave or sign of hematoma on palpation of the breast mound      Assessment:    Patient Active Problem List   Diagnosis    Other congenital or acquired abnormality of cervix, antepartum condition or complication    Sickle-cell trait (HCC)    Malignant neoplasm of left breast (HCC)    Breast cancer, left (HCC)    Pre-op testing       Plan:     Status Post : MASTOPEXY, Silicone breast augmentation right breast - Right 2/1/2024      I informed the patient she is healing well and still needs to let her right breast settle to see and feel her final result which may take up to the 3-month carol she voiced understanding after Steri-Strips removed today educated patient to begin massage to these areas in 2 weeks she may have some pinpoint drainage or bleeding from these areas she can cover with a gauze pad change as needed with saturation.    Okay to return to work no restrictions    Scar Care Instructions      Sunscreen Recommendations for Scars  We recommend that all patients use a sunscreen when outside but especially on new scars (that are exposed to sunlight) for a year after their procedure.   The SPF should be at least 35. Follow the application directions on the bottle.   Why is it so Important to Use Sunscreen on Scars?  A scar is new and more fragile than the surrounding

## 2024-02-26 ENCOUNTER — HOSPITAL ENCOUNTER (OUTPATIENT)
Dept: INFUSION THERAPY | Age: 44
Discharge: HOME | End: 2024-02-26
Payer: MEDICAID

## 2024-02-26 ENCOUNTER — OFFICE VISIT (OUTPATIENT)
Dept: ONCOLOGY | Age: 44
End: 2024-02-26
Payer: MEDICAID

## 2024-02-26 VITALS
HEART RATE: 90 BPM | SYSTOLIC BLOOD PRESSURE: 124 MMHG | OXYGEN SATURATION: 100 % | WEIGHT: 103 LBS | TEMPERATURE: 98 F | BODY MASS INDEX: 19.45 KG/M2 | DIASTOLIC BLOOD PRESSURE: 60 MMHG | HEIGHT: 61 IN

## 2024-02-26 DIAGNOSIS — Z17.0 MALIGNANT NEOPLASM OF LEFT BREAST IN FEMALE, ESTROGEN RECEPTOR POSITIVE, UNSPECIFIED SITE OF BREAST (HCC): ICD-10-CM

## 2024-02-26 DIAGNOSIS — Z17.0 MALIGNANT NEOPLASM OF LEFT BREAST IN FEMALE, ESTROGEN RECEPTOR POSITIVE, UNSPECIFIED SITE OF BREAST (HCC): Primary | ICD-10-CM

## 2024-02-26 DIAGNOSIS — C50.912 MALIGNANT NEOPLASM OF LEFT BREAST IN FEMALE, ESTROGEN RECEPTOR POSITIVE, UNSPECIFIED SITE OF BREAST (HCC): Primary | ICD-10-CM

## 2024-02-26 DIAGNOSIS — D50.9 IRON DEFICIENCY ANEMIA, UNSPECIFIED IRON DEFICIENCY ANEMIA TYPE: ICD-10-CM

## 2024-02-26 DIAGNOSIS — C50.912 MALIGNANT NEOPLASM OF LEFT BREAST IN FEMALE, ESTROGEN RECEPTOR POSITIVE, UNSPECIFIED SITE OF BREAST (HCC): ICD-10-CM

## 2024-02-26 LAB
ALBUMIN SERPL-MCNC: 4.3 G/DL (ref 3.5–5.2)
ALP SERPL-CCNC: 56 U/L (ref 35–104)
ALT SERPL-CCNC: <5 U/L (ref 0–32)
ANION GAP SERPL CALCULATED.3IONS-SCNC: 10 MMOL/L (ref 7–16)
AST SERPL-CCNC: 12 U/L (ref 0–31)
BASOPHILS # BLD: 0.01 K/UL (ref 0–0.2)
BASOPHILS NFR BLD: 0 % (ref 0–2)
BILIRUB SERPL-MCNC: 0.6 MG/DL (ref 0–1.2)
BUN SERPL-MCNC: 8 MG/DL (ref 6–20)
CALCIUM SERPL-MCNC: 9.1 MG/DL (ref 8.6–10.2)
CHLORIDE SERPL-SCNC: 106 MMOL/L (ref 98–107)
CO2 SERPL-SCNC: 27 MMOL/L (ref 22–29)
CREAT SERPL-MCNC: 0.8 MG/DL (ref 0.5–1)
EOSINOPHIL # BLD: 0.03 K/UL (ref 0.05–0.5)
EOSINOPHILS RELATIVE PERCENT: 1 % (ref 0–6)
ERYTHROCYTE [DISTWIDTH] IN BLOOD BY AUTOMATED COUNT: 18.1 % (ref 11.5–15)
FERRITIN SERPL-MCNC: 13 NG/ML
FOLATE SERPL-MCNC: 19.7 NG/ML (ref 4.8–24.2)
GFR SERPL CREATININE-BSD FRML MDRD: >60 ML/MIN/1.73M2
GLUCOSE SERPL-MCNC: 104 MG/DL (ref 74–99)
HAPTOGLOB SERPL-MCNC: 151 MG/DL (ref 30–200)
HCT VFR BLD AUTO: 28.8 % (ref 34–48)
HGB BLD-MCNC: 9.1 G/DL (ref 11.5–15.5)
IMM GRANULOCYTES # BLD AUTO: <0.03 K/UL (ref 0–0.58)
IMM GRANULOCYTES NFR BLD: 0 % (ref 0–5)
IMM RETICS NFR: 14.6 % (ref 3–15.9)
IRON SATN MFR SERPL: 26 % (ref 15–50)
IRON SERPL-MCNC: 107 UG/DL (ref 37–145)
LYMPHOCYTES NFR BLD: 1.85 K/UL (ref 1.5–4)
LYMPHOCYTES RELATIVE PERCENT: 32 % (ref 20–42)
MCH RBC QN AUTO: 24.5 PG (ref 26–35)
MCHC RBC AUTO-ENTMCNC: 31.6 G/DL (ref 32–34.5)
MCV RBC AUTO: 77.6 FL (ref 80–99.9)
MONOCYTES NFR BLD: 0.65 K/UL (ref 0.1–0.95)
MONOCYTES NFR BLD: 11 % (ref 2–12)
NEUTROPHILS NFR BLD: 56 % (ref 43–80)
NEUTS SEG NFR BLD: 3.19 K/UL (ref 1.8–7.3)
PLATELET, FLUORESCENCE: 293 K/UL (ref 130–450)
PMV BLD AUTO: 9.5 FL (ref 7–12)
POTASSIUM SERPL-SCNC: 2.9 MMOL/L (ref 3.5–5)
PROT SERPL-MCNC: 7.7 G/DL (ref 6.4–8.3)
RBC # BLD AUTO: 3.71 M/UL (ref 3.5–5.5)
RETIC HEMOGLOBIN: 28.1 PG (ref 28.2–36.6)
RETICS # AUTO: 0.03 M/UL
RETICS/RBC NFR AUTO: 0.9 % (ref 0.4–1.9)
SODIUM SERPL-SCNC: 143 MMOL/L (ref 132–146)
TIBC SERPL-MCNC: 405 UG/DL (ref 250–450)
VIT B12 SERPL-MCNC: 357 PG/ML (ref 211–946)
WBC OTHER # BLD: 5.7 K/UL (ref 4.5–11.5)

## 2024-02-26 PROCEDURE — 82746 ASSAY OF FOLIC ACID SERUM: CPT

## 2024-02-26 PROCEDURE — 82607 VITAMIN B-12: CPT

## 2024-02-26 PROCEDURE — 80053 COMPREHEN METABOLIC PANEL: CPT

## 2024-02-26 PROCEDURE — 99212 OFFICE O/P EST SF 10 MIN: CPT

## 2024-02-26 PROCEDURE — 85025 COMPLETE CBC W/AUTO DIFF WBC: CPT

## 2024-02-26 PROCEDURE — 82728 ASSAY OF FERRITIN: CPT

## 2024-02-26 PROCEDURE — 36415 COLL VENOUS BLD VENIPUNCTURE: CPT

## 2024-02-26 PROCEDURE — 99214 OFFICE O/P EST MOD 30 MIN: CPT | Performed by: INTERNAL MEDICINE

## 2024-02-26 PROCEDURE — 83540 ASSAY OF IRON: CPT

## 2024-02-26 PROCEDURE — 85045 AUTOMATED RETICULOCYTE COUNT: CPT

## 2024-02-26 PROCEDURE — 83010 ASSAY OF HAPTOGLOBIN QUANT: CPT

## 2024-02-26 PROCEDURE — 83550 IRON BINDING TEST: CPT

## 2024-02-26 RX ORDER — POTASSIUM CHLORIDE 20 MEQ/1
40 TABLET, EXTENDED RELEASE ORAL DAILY
Qty: 6 TABLET | Refills: 0 | Status: SHIPPED | OUTPATIENT
Start: 2024-02-26 | End: 2024-02-29

## 2024-02-26 RX ORDER — SODIUM CHLORIDE 9 MG/ML
5-250 INJECTION, SOLUTION INTRAVENOUS PRN
OUTPATIENT
Start: 2024-02-28

## 2024-02-26 RX ORDER — ACETAMINOPHEN 325 MG/1
650 TABLET ORAL
OUTPATIENT
Start: 2024-02-28

## 2024-02-26 RX ORDER — EPINEPHRINE 1 MG/ML
0.3 INJECTION, SOLUTION, CONCENTRATE INTRAVENOUS PRN
OUTPATIENT
Start: 2024-02-28

## 2024-02-26 RX ORDER — TAMOXIFEN CITRATE 20 MG/1
20 TABLET ORAL DAILY
Qty: 30 TABLET | Refills: 0 | Status: SHIPPED | OUTPATIENT
Start: 2024-02-26 | End: 2024-03-27

## 2024-02-26 RX ORDER — SODIUM CHLORIDE 0.9 % (FLUSH) 0.9 %
5-40 SYRINGE (ML) INJECTION PRN
OUTPATIENT
Start: 2024-02-28

## 2024-02-26 RX ORDER — DIPHENHYDRAMINE HYDROCHLORIDE 50 MG/ML
50 INJECTION INTRAMUSCULAR; INTRAVENOUS
OUTPATIENT
Start: 2024-02-28

## 2024-02-26 RX ORDER — ONDANSETRON 2 MG/ML
8 INJECTION INTRAMUSCULAR; INTRAVENOUS
OUTPATIENT
Start: 2024-02-28

## 2024-02-26 RX ORDER — ALBUTEROL SULFATE 90 UG/1
4 AEROSOL, METERED RESPIRATORY (INHALATION) PRN
OUTPATIENT
Start: 2024-02-28

## 2024-02-26 RX ORDER — FAMOTIDINE 10 MG/ML
20 INJECTION, SOLUTION INTRAVENOUS
OUTPATIENT
Start: 2024-02-28

## 2024-02-26 RX ORDER — SODIUM CHLORIDE 9 MG/ML
INJECTION, SOLUTION INTRAVENOUS CONTINUOUS
OUTPATIENT
Start: 2024-02-28

## 2024-02-26 RX ORDER — HEPARIN SODIUM (PORCINE) LOCK FLUSH IV SOLN 100 UNIT/ML 100 UNIT/ML
500 SOLUTION INTRAVENOUS PRN
OUTPATIENT
Start: 2024-02-28

## 2024-02-26 NOTE — PROGRESS NOTES
Patient provided with discharge instructions, patient has MYCHART.  All questions answered.  Patient understands follow up plan of care.       
PCP.    Screening mammo bilateral negative on 9/18/2023, next on 9/18/2024  Genetics: Daniel Arnoldo Genetic testing Negative  No known pathogenic or likely pathogenic variants were detected.  She denies family history of cancer.    #Anemia/LELIA,  -Follow-up CBC, CMP, reticulocyte, haptoglobin, iron study, B12, folate  - If LELIA confirmed, patient prefers IV iron.  We will start her on IV iron and refer her to GI.  -f/u in 3 months with iron study and cbc, then in 9/18/24 with b/l mammo screen     Patient may follow-up earlier should she experience new or worsening symptoms.  I encouraged her to ask questions.  I answered her questions and addressed her concerns to her satisfaction.  I spent 60 minutes reviewing her chart and counseling her on above assessment and plan.  She is in agreement.

## 2024-03-02 PROBLEM — Z01.818 PRE-OP TESTING: Status: RESOLVED | Noted: 2024-02-01 | Resolved: 2024-03-02

## 2024-03-06 ENCOUNTER — HOSPITAL ENCOUNTER (OUTPATIENT)
Dept: INFUSION THERAPY | Age: 44
Discharge: HOME | End: 2024-03-06
Payer: MEDICAID

## 2024-03-06 VITALS
HEART RATE: 78 BPM | TEMPERATURE: 98 F | DIASTOLIC BLOOD PRESSURE: 65 MMHG | OXYGEN SATURATION: 100 % | RESPIRATION RATE: 16 BRPM | SYSTOLIC BLOOD PRESSURE: 114 MMHG

## 2024-03-06 DIAGNOSIS — D50.9 IRON DEFICIENCY ANEMIA, UNSPECIFIED IRON DEFICIENCY ANEMIA TYPE: Primary | ICD-10-CM

## 2024-03-06 PROCEDURE — 2580000003 HC RX 258: Performed by: INTERNAL MEDICINE

## 2024-03-06 PROCEDURE — 96366 THER/PROPH/DIAG IV INF ADDON: CPT

## 2024-03-06 PROCEDURE — 96365 THER/PROPH/DIAG IV INF INIT: CPT

## 2024-03-06 PROCEDURE — 6360000002 HC RX W HCPCS: Performed by: INTERNAL MEDICINE

## 2024-03-06 RX ORDER — ACETAMINOPHEN 325 MG/1
650 TABLET ORAL
OUTPATIENT
Start: 2024-03-13

## 2024-03-06 RX ORDER — SODIUM CHLORIDE 9 MG/ML
INJECTION, SOLUTION INTRAVENOUS CONTINUOUS
OUTPATIENT
Start: 2024-03-13

## 2024-03-06 RX ORDER — HEPARIN 100 UNIT/ML
500 SYRINGE INTRAVENOUS PRN
OUTPATIENT
Start: 2024-03-13

## 2024-03-06 RX ORDER — ONDANSETRON 2 MG/ML
8 INJECTION INTRAMUSCULAR; INTRAVENOUS
OUTPATIENT
Start: 2024-03-13

## 2024-03-06 RX ORDER — SODIUM CHLORIDE 0.9 % (FLUSH) 0.9 %
5-40 SYRINGE (ML) INJECTION PRN
OUTPATIENT
Start: 2024-03-13

## 2024-03-06 RX ORDER — SODIUM CHLORIDE 9 MG/ML
5-250 INJECTION, SOLUTION INTRAVENOUS PRN
Status: DISCONTINUED | OUTPATIENT
Start: 2024-03-06 | End: 2024-03-07 | Stop reason: HOSPADM

## 2024-03-06 RX ORDER — ALBUTEROL SULFATE 90 UG/1
4 AEROSOL, METERED RESPIRATORY (INHALATION) PRN
OUTPATIENT
Start: 2024-03-13

## 2024-03-06 RX ORDER — SODIUM CHLORIDE 9 MG/ML
5-250 INJECTION, SOLUTION INTRAVENOUS PRN
OUTPATIENT
Start: 2024-03-13

## 2024-03-06 RX ORDER — EPINEPHRINE 1 MG/ML
0.3 INJECTION, SOLUTION, CONCENTRATE INTRAVENOUS PRN
OUTPATIENT
Start: 2024-03-13

## 2024-03-06 RX ORDER — DIPHENHYDRAMINE HYDROCHLORIDE 50 MG/ML
50 INJECTION INTRAMUSCULAR; INTRAVENOUS
OUTPATIENT
Start: 2024-03-13

## 2024-03-06 RX ORDER — SODIUM CHLORIDE 0.9 % (FLUSH) 0.9 %
5-40 SYRINGE (ML) INJECTION PRN
Status: DISCONTINUED | OUTPATIENT
Start: 2024-03-06 | End: 2024-03-07 | Stop reason: HOSPADM

## 2024-03-06 RX ADMIN — SODIUM CHLORIDE 300 MG: 9 INJECTION, SOLUTION INTRAVENOUS at 10:38

## 2024-03-06 RX ADMIN — SODIUM CHLORIDE 177 ML/HR: 9 INJECTION, SOLUTION INTRAVENOUS at 10:35

## 2024-03-13 ENCOUNTER — HOSPITAL ENCOUNTER (OUTPATIENT)
Dept: INFUSION THERAPY | Age: 44
Discharge: HOME | End: 2024-03-13
Payer: MEDICAID

## 2024-03-13 VITALS
DIASTOLIC BLOOD PRESSURE: 71 MMHG | RESPIRATION RATE: 16 BRPM | OXYGEN SATURATION: 98 % | SYSTOLIC BLOOD PRESSURE: 117 MMHG | HEART RATE: 97 BPM | TEMPERATURE: 98 F

## 2024-03-13 DIAGNOSIS — D50.9 IRON DEFICIENCY ANEMIA, UNSPECIFIED IRON DEFICIENCY ANEMIA TYPE: Primary | ICD-10-CM

## 2024-03-13 PROCEDURE — 2580000003 HC RX 258: Performed by: INTERNAL MEDICINE

## 2024-03-13 PROCEDURE — 96366 THER/PROPH/DIAG IV INF ADDON: CPT

## 2024-03-13 PROCEDURE — 6360000002 HC RX W HCPCS: Performed by: INTERNAL MEDICINE

## 2024-03-13 PROCEDURE — 96365 THER/PROPH/DIAG IV INF INIT: CPT

## 2024-03-13 RX ORDER — ONDANSETRON 2 MG/ML
8 INJECTION INTRAMUSCULAR; INTRAVENOUS
Status: CANCELLED | OUTPATIENT
Start: 2024-03-20

## 2024-03-13 RX ORDER — SODIUM CHLORIDE 9 MG/ML
5-250 INJECTION, SOLUTION INTRAVENOUS PRN
Status: CANCELLED | OUTPATIENT
Start: 2024-03-20

## 2024-03-13 RX ORDER — SODIUM CHLORIDE 9 MG/ML
5-250 INJECTION, SOLUTION INTRAVENOUS PRN
Status: DISCONTINUED | OUTPATIENT
Start: 2024-03-13 | End: 2024-03-14 | Stop reason: HOSPADM

## 2024-03-13 RX ORDER — ALBUTEROL SULFATE 90 UG/1
4 AEROSOL, METERED RESPIRATORY (INHALATION) PRN
Status: CANCELLED | OUTPATIENT
Start: 2024-03-20

## 2024-03-13 RX ORDER — HEPARIN 100 UNIT/ML
500 SYRINGE INTRAVENOUS PRN
Status: CANCELLED | OUTPATIENT
Start: 2024-03-20

## 2024-03-13 RX ORDER — SODIUM CHLORIDE 9 MG/ML
INJECTION, SOLUTION INTRAVENOUS CONTINUOUS
Status: CANCELLED | OUTPATIENT
Start: 2024-03-20

## 2024-03-13 RX ORDER — ACETAMINOPHEN 325 MG/1
650 TABLET ORAL
Status: CANCELLED | OUTPATIENT
Start: 2024-03-20

## 2024-03-13 RX ORDER — EPINEPHRINE 1 MG/ML
0.3 INJECTION, SOLUTION, CONCENTRATE INTRAVENOUS PRN
Status: CANCELLED | OUTPATIENT
Start: 2024-03-20

## 2024-03-13 RX ORDER — DIPHENHYDRAMINE HYDROCHLORIDE 50 MG/ML
50 INJECTION INTRAMUSCULAR; INTRAVENOUS
Status: CANCELLED | OUTPATIENT
Start: 2024-03-20

## 2024-03-13 RX ORDER — SODIUM CHLORIDE 0.9 % (FLUSH) 0.9 %
5-40 SYRINGE (ML) INJECTION PRN
Status: CANCELLED | OUTPATIENT
Start: 2024-03-20

## 2024-03-13 RX ADMIN — SODIUM CHLORIDE 300 MG: 9 INJECTION, SOLUTION INTRAVENOUS at 11:27

## 2024-03-13 RX ADMIN — SODIUM CHLORIDE 50 ML/HR: 9 INJECTION, SOLUTION INTRAVENOUS at 11:25

## 2024-03-13 NOTE — PROGRESS NOTES
Patient tolerated venofer infusion well. Remained on unit for 10 minutes after treatment. Patient alert and oriented x3. No distress noted. Vital signs stable. Patient denies any new or worsening pain. Educated patient on possible side effects and treatment of medication.     Patient verbalized understanding. Offered patient education and/or discharge material. Patient declined. Patient denies any needs. All questions answered. D/C in stable condition.

## 2024-03-20 ENCOUNTER — HOSPITAL ENCOUNTER (OUTPATIENT)
Dept: INFUSION THERAPY | Age: 44
Discharge: HOME | End: 2024-03-20
Payer: MEDICAID

## 2024-03-20 VITALS
TEMPERATURE: 97.8 F | SYSTOLIC BLOOD PRESSURE: 116 MMHG | OXYGEN SATURATION: 100 % | DIASTOLIC BLOOD PRESSURE: 74 MMHG | HEART RATE: 82 BPM

## 2024-03-20 DIAGNOSIS — D50.9 IRON DEFICIENCY ANEMIA, UNSPECIFIED IRON DEFICIENCY ANEMIA TYPE: Primary | ICD-10-CM

## 2024-03-20 PROCEDURE — 96365 THER/PROPH/DIAG IV INF INIT: CPT

## 2024-03-20 PROCEDURE — 6360000002 HC RX W HCPCS: Performed by: INTERNAL MEDICINE

## 2024-03-20 PROCEDURE — 96366 THER/PROPH/DIAG IV INF ADDON: CPT

## 2024-03-20 PROCEDURE — 2580000003 HC RX 258: Performed by: INTERNAL MEDICINE

## 2024-03-20 RX ORDER — SODIUM CHLORIDE 0.9 % (FLUSH) 0.9 %
5-40 SYRINGE (ML) INJECTION PRN
Status: DISCONTINUED | OUTPATIENT
Start: 2024-03-20 | End: 2024-03-21 | Stop reason: HOSPADM

## 2024-03-20 RX ORDER — ONDANSETRON 2 MG/ML
8 INJECTION INTRAMUSCULAR; INTRAVENOUS
Status: CANCELLED | OUTPATIENT
Start: 2024-03-27

## 2024-03-20 RX ORDER — DIPHENHYDRAMINE HYDROCHLORIDE 50 MG/ML
50 INJECTION INTRAMUSCULAR; INTRAVENOUS
Status: CANCELLED | OUTPATIENT
Start: 2024-03-27

## 2024-03-20 RX ORDER — ALBUTEROL SULFATE 90 UG/1
4 AEROSOL, METERED RESPIRATORY (INHALATION) PRN
Status: CANCELLED | OUTPATIENT
Start: 2024-03-27

## 2024-03-20 RX ORDER — SODIUM CHLORIDE 9 MG/ML
5-250 INJECTION, SOLUTION INTRAVENOUS PRN
Status: CANCELLED | OUTPATIENT
Start: 2024-03-27

## 2024-03-20 RX ORDER — EPINEPHRINE 1 MG/ML
0.3 INJECTION, SOLUTION, CONCENTRATE INTRAVENOUS PRN
Status: CANCELLED | OUTPATIENT
Start: 2024-03-27

## 2024-03-20 RX ORDER — HEPARIN 100 UNIT/ML
500 SYRINGE INTRAVENOUS PRN
Status: CANCELLED | OUTPATIENT
Start: 2024-03-27

## 2024-03-20 RX ORDER — SODIUM CHLORIDE 9 MG/ML
INJECTION, SOLUTION INTRAVENOUS CONTINUOUS
Status: CANCELLED | OUTPATIENT
Start: 2024-03-27

## 2024-03-20 RX ORDER — SODIUM CHLORIDE 9 MG/ML
5-250 INJECTION, SOLUTION INTRAVENOUS PRN
Status: DISCONTINUED | OUTPATIENT
Start: 2024-03-20 | End: 2024-03-21 | Stop reason: HOSPADM

## 2024-03-20 RX ORDER — SODIUM CHLORIDE 0.9 % (FLUSH) 0.9 %
5-40 SYRINGE (ML) INJECTION PRN
Status: CANCELLED | OUTPATIENT
Start: 2024-03-27

## 2024-03-20 RX ORDER — ACETAMINOPHEN 325 MG/1
650 TABLET ORAL
Status: CANCELLED | OUTPATIENT
Start: 2024-03-27

## 2024-03-20 RX ADMIN — SODIUM CHLORIDE 300 MG: 9 INJECTION, SOLUTION INTRAVENOUS at 11:20

## 2024-03-20 RX ADMIN — SODIUM CHLORIDE 200 ML/HR: 9 INJECTION, SOLUTION INTRAVENOUS at 11:19

## 2024-03-20 RX ADMIN — SODIUM CHLORIDE, PRESERVATIVE FREE 10 ML: 5 INJECTION INTRAVENOUS at 11:19

## 2024-03-20 NOTE — PROGRESS NOTES
Patient tolerated Venofer infusion well. Patient alert and oriented x 3. No distress noted. Vital signs stable. Patient denies any new or worsening pain. Patient denies questions regarding treatment or medication; verbalized understanding, offered patient information and discharge material; patient declined; Patient denies needs, all questions answered.

## 2024-03-27 ENCOUNTER — HOSPITAL ENCOUNTER (OUTPATIENT)
Dept: INFUSION THERAPY | Age: 44
Discharge: HOME | End: 2024-03-27
Payer: MEDICAID

## 2024-03-27 VITALS
HEART RATE: 93 BPM | SYSTOLIC BLOOD PRESSURE: 110 MMHG | DIASTOLIC BLOOD PRESSURE: 65 MMHG | OXYGEN SATURATION: 97 % | TEMPERATURE: 98.4 F

## 2024-03-27 DIAGNOSIS — D50.9 IRON DEFICIENCY ANEMIA, UNSPECIFIED IRON DEFICIENCY ANEMIA TYPE: Primary | ICD-10-CM

## 2024-03-27 PROCEDURE — 96365 THER/PROPH/DIAG IV INF INIT: CPT

## 2024-03-27 PROCEDURE — 6360000002 HC RX W HCPCS: Performed by: INTERNAL MEDICINE

## 2024-03-27 PROCEDURE — 96366 THER/PROPH/DIAG IV INF ADDON: CPT

## 2024-03-27 PROCEDURE — 2580000003 HC RX 258: Performed by: INTERNAL MEDICINE

## 2024-03-27 RX ORDER — ALBUTEROL SULFATE 90 UG/1
4 AEROSOL, METERED RESPIRATORY (INHALATION) PRN
OUTPATIENT
Start: 2024-03-27

## 2024-03-27 RX ORDER — SODIUM CHLORIDE 9 MG/ML
5-250 INJECTION, SOLUTION INTRAVENOUS PRN
Status: DISCONTINUED | OUTPATIENT
Start: 2024-03-27 | End: 2024-03-28 | Stop reason: HOSPADM

## 2024-03-27 RX ORDER — SODIUM CHLORIDE 9 MG/ML
INJECTION, SOLUTION INTRAVENOUS CONTINUOUS
OUTPATIENT
Start: 2024-03-27

## 2024-03-27 RX ORDER — DIPHENHYDRAMINE HYDROCHLORIDE 50 MG/ML
50 INJECTION INTRAMUSCULAR; INTRAVENOUS
OUTPATIENT
Start: 2024-03-27

## 2024-03-27 RX ORDER — ONDANSETRON 2 MG/ML
8 INJECTION INTRAMUSCULAR; INTRAVENOUS
OUTPATIENT
Start: 2024-03-27

## 2024-03-27 RX ORDER — EPINEPHRINE 1 MG/ML
0.3 INJECTION, SOLUTION, CONCENTRATE INTRAVENOUS PRN
OUTPATIENT
Start: 2024-03-27

## 2024-03-27 RX ORDER — SODIUM CHLORIDE 9 MG/ML
5-250 INJECTION, SOLUTION INTRAVENOUS PRN
OUTPATIENT
Start: 2024-03-27

## 2024-03-27 RX ORDER — ACETAMINOPHEN 325 MG/1
650 TABLET ORAL
OUTPATIENT
Start: 2024-03-27

## 2024-03-27 RX ORDER — HEPARIN 100 UNIT/ML
500 SYRINGE INTRAVENOUS PRN
OUTPATIENT
Start: 2024-03-27

## 2024-03-27 RX ORDER — SODIUM CHLORIDE 0.9 % (FLUSH) 0.9 %
5-40 SYRINGE (ML) INJECTION PRN
OUTPATIENT
Start: 2024-03-27

## 2024-03-27 RX ADMIN — SODIUM CHLORIDE 200 ML/HR: 9 INJECTION, SOLUTION INTRAVENOUS at 11:08

## 2024-03-27 RX ADMIN — SODIUM CHLORIDE 300 MG: 9 INJECTION, SOLUTION INTRAVENOUS at 11:20

## 2024-03-27 NOTE — PROGRESS NOTES
Patient tolerated infusion well. Patient alert and oriented x 3. No distress noted. Vital signs stable. Patient denies any new or worsening pain. Patient denies questions regarding treatment or medication; verbalized understanding, offered patient information and discharge material; patient declined; Patient denies needs, all questions answered.

## 2024-04-08 RX ORDER — TAMOXIFEN CITRATE 20 MG/1
20 TABLET ORAL DAILY
Qty: 30 TABLET | Refills: 0 | Status: SHIPPED | OUTPATIENT
Start: 2024-04-08 | End: 2024-05-08

## 2024-04-24 ENCOUNTER — HOSPITAL ENCOUNTER (OUTPATIENT)
Dept: GENERAL RADIOLOGY | Age: 44
Discharge: HOME | End: 2024-04-26
Payer: MEDICAID

## 2024-04-24 ENCOUNTER — OFFICE VISIT (OUTPATIENT)
Dept: ONCOLOGY | Age: 44
End: 2024-04-24
Payer: MEDICAID

## 2024-04-24 ENCOUNTER — HOSPITAL ENCOUNTER (OUTPATIENT)
Dept: INFUSION THERAPY | Age: 44
Discharge: HOME | End: 2024-04-24

## 2024-04-24 VITALS
DIASTOLIC BLOOD PRESSURE: 80 MMHG | TEMPERATURE: 97.4 F | SYSTOLIC BLOOD PRESSURE: 121 MMHG | OXYGEN SATURATION: 99 % | WEIGHT: 97.4 LBS | HEIGHT: 61 IN | BODY MASS INDEX: 18.39 KG/M2 | HEART RATE: 109 BPM

## 2024-04-24 DIAGNOSIS — R53.83 OTHER FATIGUE: ICD-10-CM

## 2024-04-24 DIAGNOSIS — C50.912 MALIGNANT NEOPLASM OF LEFT BREAST IN FEMALE, ESTROGEN RECEPTOR POSITIVE, UNSPECIFIED SITE OF BREAST (HCC): Primary | ICD-10-CM

## 2024-04-24 DIAGNOSIS — Z17.0 MALIGNANT NEOPLASM OF LEFT BREAST IN FEMALE, ESTROGEN RECEPTOR POSITIVE, UNSPECIFIED SITE OF BREAST (HCC): ICD-10-CM

## 2024-04-24 DIAGNOSIS — Z17.0 MALIGNANT NEOPLASM OF LEFT BREAST IN FEMALE, ESTROGEN RECEPTOR POSITIVE, UNSPECIFIED SITE OF BREAST (HCC): Primary | ICD-10-CM

## 2024-04-24 DIAGNOSIS — C50.912 MALIGNANT NEOPLASM OF LEFT BREAST IN FEMALE, ESTROGEN RECEPTOR POSITIVE, UNSPECIFIED SITE OF BREAST (HCC): ICD-10-CM

## 2024-04-24 PROCEDURE — 1036F TOBACCO NON-USER: CPT | Performed by: CLINICAL NURSE SPECIALIST

## 2024-04-24 PROCEDURE — G8419 CALC BMI OUT NRM PARAM NOF/U: HCPCS | Performed by: CLINICAL NURSE SPECIALIST

## 2024-04-24 PROCEDURE — 99213 OFFICE O/P EST LOW 20 MIN: CPT | Performed by: CLINICAL NURSE SPECIALIST

## 2024-04-24 PROCEDURE — 76642 ULTRASOUND BREAST LIMITED: CPT

## 2024-04-24 PROCEDURE — G8427 DOCREV CUR MEDS BY ELIG CLIN: HCPCS | Performed by: CLINICAL NURSE SPECIALIST

## 2024-04-25 NOTE — PROGRESS NOTES
Patient provided with discharge instructions, received printed AVS.  All questions answered.  Patient understands follow up plan of care.     
reaction, aggregates of hemosiderin   macrophages, consistent with changes of previous needle biopsy site, see comment.     C.  Left sentinel lymph node #1, biopsy: One (1) benign node showing prominent fatty replacement.     D.  Left sentinel lymph node #2, biopsy: One (1) benign lymph node with reactive changes.     CANCER CASE SUMMARY (combined information from specimens A, B and report [facility]-)   Procedure: Excision   Specimen Laterality: Left   Tumor Site: 2:00   Histologic Type: Invasive Carcinoma of no special type (ductal)   Histologic Grade: [place] Histologic Score        Glandular/tubular differentiation: Score 1        Nuclear pleomorphism: Score 2        Mitotic rate: Score 1        Overall grade: G1 (scores of 4)   Tumor Size: 2 mm   Tumor Focality: Single focus   Ductal Carcinoma In Situ (DCIS): Present        Negative for extensive intraductal component        Size of DCIS: Cannot be determined (a few small foci)        Architectural patterns: Cribriform/papillary and micropapillary        Nuclear grade: Grade 2 (intermediate)        Necrosis: Not identified        Number of blocks with DCIS: 5        Number of blocks examined: 18        Lobular Carcinoma In Situ (LCIS): Not identified   Lymphatic and/or vascular invasion: Not identified   Microcalcifications: Present in DCIS   Treatment effect in the breast/lymph nodes: No known presurgical therapy   Margins:   Margin status for invasive carcinoma:        All margins negative for invasive carcinoma        Distance from invasive carcinoma to closest margin: 8 mm (see comment)        Closest margin to invasive carcinoma: Posterior   Margin status for DCIS:        All margins negative for DCIS        Distance from DCIS to closest margin in specimen A: < 1 mm        Closest margin to DCIS: Superior (red ink, slide A5)        Distance from DCIS to closest margin in specimen B: 4 mm        Closest margin to DCIS: Posterior black ink, slide B5

## 2024-04-26 ENCOUNTER — TELEPHONE (OUTPATIENT)
Dept: INFUSION THERAPY | Age: 44
End: 2024-04-26

## 2024-04-26 NOTE — TELEPHONE ENCOUNTER
Patient's US reviewed with Amira BLUNT NP. Patient US looked good, no lump or mass and should follow up with PCP ASAP. Patient notified, no answer, message left

## 2024-05-16 LAB — SURGICAL PATHOLOGY REPORT: NORMAL

## 2024-06-06 ENCOUNTER — OFFICE VISIT (OUTPATIENT)
Dept: SURGERY | Age: 44
End: 2024-06-06

## 2024-06-06 VITALS — TEMPERATURE: 97.4 F

## 2024-06-06 DIAGNOSIS — Z98.890 S/P BREAST RECONSTRUCTION: Primary | ICD-10-CM

## 2024-06-06 PROCEDURE — 99024 POSTOP FOLLOW-UP VISIT: CPT | Performed by: PHYSICIAN ASSISTANT

## 2024-06-06 NOTE — PROGRESS NOTES
Subjective:    Follow up today from MASTOPEXY, Silicone breast augmentation right breast - Right 2/1/2024 . Denies fever, nausea, vomiting, leg pain or swelling, pain is abasent.  She presents today for continued wound examination to the right breast   she is ambulating at home.  She is  wearing her surgical bra at all times.   She voices no complaints at this time    Objective:    Temp 97.4 °F (36.3 °C) (Infrared)           Right Breast Clean, dry, and intact, no drainage. no signs of infection.   incisions are clean dry and intact no fluid wave or sign of hematoma on palpation of the breast mound breast implant is soft on palpation no concern for capsular contracture      Assessment:    Patient Active Problem List   Diagnosis    Other congenital or acquired abnormality of cervix, antepartum condition or complication    Sickle-cell trait (HCC)    Malignant neoplasm of left breast (HCC)    Breast cancer, left (HCC)    Iron deficiency anemia       Plan:     Status Post : MASTOPEXY, Silicone breast augmentation right breast - Right 2/1/2024      I informed the patient today that they can continue with a bra of their choice.  I explained to the patient that although their breast reconstruction has settled there may be continued changes.   It is okay for the patient to continue with scar massage at this time as I have directed them.  I informed the patient that they may have subtle changes to the breast as far as scar maturity and breast parenchyma changes.  I advised the patient to bring any new masses or suspicious lesions to our office attention or their primary care physician as well as their OB/GYN.  They will need to continue with regularly scheduled mammograms as seen fit by their age if indicated as well as MRI to assess the implant 5-6 years post operatively     Bra of her choice    Scar Care Instructions      Sunscreen Recommendations for Scars  We recommend that all patients use a sunscreen when outside but

## 2024-07-23 DIAGNOSIS — D50.9 IRON DEFICIENCY ANEMIA, UNSPECIFIED IRON DEFICIENCY ANEMIA TYPE: Primary | ICD-10-CM

## 2024-07-24 ENCOUNTER — OFFICE VISIT (OUTPATIENT)
Dept: ONCOLOGY | Age: 44
End: 2024-07-24
Payer: MEDICAID

## 2024-07-24 ENCOUNTER — HOSPITAL ENCOUNTER (OUTPATIENT)
Dept: INFUSION THERAPY | Age: 44
Discharge: HOME | End: 2024-07-24
Payer: MEDICAID

## 2024-07-24 VITALS
DIASTOLIC BLOOD PRESSURE: 59 MMHG | BODY MASS INDEX: 18.35 KG/M2 | OXYGEN SATURATION: 100 % | WEIGHT: 97.1 LBS | TEMPERATURE: 97.2 F | SYSTOLIC BLOOD PRESSURE: 117 MMHG | HEART RATE: 62 BPM

## 2024-07-24 DIAGNOSIS — D50.9 IRON DEFICIENCY ANEMIA, UNSPECIFIED IRON DEFICIENCY ANEMIA TYPE: ICD-10-CM

## 2024-07-24 DIAGNOSIS — C50.912 MALIGNANT NEOPLASM OF LEFT BREAST IN FEMALE, ESTROGEN RECEPTOR POSITIVE, UNSPECIFIED SITE OF BREAST (HCC): Primary | ICD-10-CM

## 2024-07-24 DIAGNOSIS — Z17.0 MALIGNANT NEOPLASM OF LEFT BREAST IN FEMALE, ESTROGEN RECEPTOR POSITIVE, UNSPECIFIED SITE OF BREAST (HCC): Primary | ICD-10-CM

## 2024-07-24 DIAGNOSIS — R53.83 OTHER FATIGUE: ICD-10-CM

## 2024-07-24 LAB
ALBUMIN SERPL-MCNC: 4.2 G/DL (ref 3.5–5.2)
ALP SERPL-CCNC: 60 U/L (ref 35–104)
ALT SERPL-CCNC: 9 U/L (ref 0–32)
ANION GAP SERPL CALCULATED.3IONS-SCNC: 7 MMOL/L (ref 7–16)
AST SERPL-CCNC: 14 U/L (ref 0–31)
BASOPHILS # BLD: 0.02 K/UL (ref 0–0.2)
BASOPHILS NFR BLD: 0 % (ref 0–2)
BILIRUB SERPL-MCNC: 1 MG/DL (ref 0–1.2)
BUN SERPL-MCNC: 9 MG/DL (ref 6–20)
CALCIUM SERPL-MCNC: 8.7 MG/DL (ref 8.6–10.2)
CHLORIDE SERPL-SCNC: 103 MMOL/L (ref 98–107)
CO2 SERPL-SCNC: 30 MMOL/L (ref 22–29)
CREAT SERPL-MCNC: 0.7 MG/DL (ref 0.5–1)
EOSINOPHIL # BLD: 0.01 K/UL (ref 0.05–0.5)
EOSINOPHILS RELATIVE PERCENT: 0 % (ref 0–6)
ERYTHROCYTE [DISTWIDTH] IN BLOOD BY AUTOMATED COUNT: 13.6 % (ref 11.5–15)
FERRITIN SERPL-MCNC: 113 NG/ML
GFR, ESTIMATED: >90 ML/MIN/1.73M2
GLUCOSE SERPL-MCNC: 97 MG/DL (ref 74–99)
HCT VFR BLD AUTO: 33.6 % (ref 34–48)
HGB BLD-MCNC: 11.4 G/DL (ref 11.5–15.5)
IMM GRANULOCYTES # BLD AUTO: <0.03 K/UL (ref 0–0.58)
IMM GRANULOCYTES NFR BLD: 0 % (ref 0–5)
IRON SATN MFR SERPL: 27 % (ref 15–50)
IRON SERPL-MCNC: 71 UG/DL (ref 37–145)
LYMPHOCYTES NFR BLD: 1.91 K/UL (ref 1.5–4)
LYMPHOCYTES RELATIVE PERCENT: 25 % (ref 20–42)
MCH RBC QN AUTO: 30.5 PG (ref 26–35)
MCHC RBC AUTO-ENTMCNC: 33.9 G/DL (ref 32–34.5)
MCV RBC AUTO: 89.8 FL (ref 80–99.9)
MONOCYTES NFR BLD: 0.53 K/UL (ref 0.1–0.95)
MONOCYTES NFR BLD: 7 % (ref 2–12)
NEUTROPHILS NFR BLD: 68 % (ref 43–80)
NEUTS SEG NFR BLD: 5.28 K/UL (ref 1.8–7.3)
PLATELET # BLD AUTO: 269 K/UL (ref 130–450)
PMV BLD AUTO: 9.2 FL (ref 7–12)
POTASSIUM SERPL-SCNC: 3.5 MMOL/L (ref 3.5–5)
PROT SERPL-MCNC: 7.2 G/DL (ref 6.4–8.3)
RBC # BLD AUTO: 3.74 M/UL (ref 3.5–5.5)
SODIUM SERPL-SCNC: 140 MMOL/L (ref 132–146)
TIBC SERPL-MCNC: 265 UG/DL (ref 250–450)
TSH SERPL DL<=0.05 MIU/L-ACNC: 0.34 UIU/ML (ref 0.27–4.2)
WBC OTHER # BLD: 7.8 K/UL (ref 4.5–11.5)

## 2024-07-24 PROCEDURE — G8427 DOCREV CUR MEDS BY ELIG CLIN: HCPCS | Performed by: CLINICAL NURSE SPECIALIST

## 2024-07-24 PROCEDURE — 84443 ASSAY THYROID STIM HORMONE: CPT

## 2024-07-24 PROCEDURE — G8419 CALC BMI OUT NRM PARAM NOF/U: HCPCS | Performed by: CLINICAL NURSE SPECIALIST

## 2024-07-24 PROCEDURE — 80053 COMPREHEN METABOLIC PANEL: CPT

## 2024-07-24 PROCEDURE — 99213 OFFICE O/P EST LOW 20 MIN: CPT

## 2024-07-24 PROCEDURE — 82728 ASSAY OF FERRITIN: CPT

## 2024-07-24 PROCEDURE — 85025 COMPLETE CBC W/AUTO DIFF WBC: CPT

## 2024-07-24 PROCEDURE — 36415 COLL VENOUS BLD VENIPUNCTURE: CPT

## 2024-07-24 PROCEDURE — 1036F TOBACCO NON-USER: CPT | Performed by: CLINICAL NURSE SPECIALIST

## 2024-07-24 PROCEDURE — 83550 IRON BINDING TEST: CPT

## 2024-07-24 PROCEDURE — 83540 ASSAY OF IRON: CPT

## 2024-07-24 PROCEDURE — 99213 OFFICE O/P EST LOW 20 MIN: CPT | Performed by: CLINICAL NURSE SPECIALIST

## 2024-07-24 NOTE — PROGRESS NOTES
Department of [facility] Med Oncology  Attending Clinic Note    Reason for Visit:  Follow-up on a patient with Left Breast Cancer    PCP:  Susan Rose MD    History of Present Illness:  43-year-old female who had an abnormal mammogram 8/11/22 showing left breast group of pleomorphic micro-calcifications within the mid to posterior parenchyma of the upper outer left breast. She then went to [place] to see about implants.     Left breast, 2 o'clock, stereotactic core biopsy on 11/09/2022:   Focal invasive ductal carcinoma, well differentiated  Focal intermediate-grade ductal carcinoma in situ of micropapillary and cribriform types.   Associated microcalcifications.   Focal fibrocystic changes.     Breast Cancer Marker Studies:   Estrogen Receptors (ER): 95%   Progesterone Receptors ([place]): >95%   HER2/charlene protein expression: Negative (0).   Ki-67 labeling index: <5%     CXR 11/23/2022: No acute process    MRI Bilateral Breast 01/10/2023:  Focal, heterogeneous non mass enhancement in the left breast 2 o'clock measuring up to 2.4 cm in size. No additional foci of disease identified on the left.   No evidence of neoplasm in the right breast.    Left breast bracketed 2 mag seeds localized lumpectomy left axillary sentinel lymph node biopsy on 02/07/2023  A.  Left breast, anterior with seed, lumpectomy:   - Ductal carcinoma in situ, intermediate nuclear grade, cribriform/papillary type;   - Cancerization of lobules by DCIS;   - Fibrocystic changes with apocrine metaplasia, sclerosing lobular hyperplasia, microcysts and stromal fibrosis/hyalinosis:   - Microcalcifications in DCIS;   - Magseed and focal fresh hemorrhage, consistent with changes of biopsy site;   - Dermal scar, see comment.     B.  Left breast, posterior with seed, lumpectomy:    - Ductal carcinoma in situ, intermediate nuclear grade, cribriform and micropapillary types;    - Atypical ductal hyperplasia also present;    - Fibrocystic changes with

## 2024-08-13 ENCOUNTER — HOSPITAL ENCOUNTER (EMERGENCY)
Age: 44
Discharge: OTHER INSTITUTION | End: 2024-08-13
Attending: STUDENT IN AN ORGANIZED HEALTH CARE EDUCATION/TRAINING PROGRAM
Payer: MEDICAID

## 2024-08-13 ENCOUNTER — APPOINTMENT (OUTPATIENT)
Dept: GENERAL RADIOLOGY | Age: 44
End: 2024-08-13
Payer: MEDICAID

## 2024-08-13 ENCOUNTER — HOSPITAL ENCOUNTER (EMERGENCY)
Age: 44
Discharge: HOME | End: 2024-08-13
Payer: MEDICAID

## 2024-08-13 VITALS
OXYGEN SATURATION: 100 % | HEART RATE: 70 BPM | RESPIRATION RATE: 18 BRPM | SYSTOLIC BLOOD PRESSURE: 126 MMHG | TEMPERATURE: 97.5 F | DIASTOLIC BLOOD PRESSURE: 71 MMHG

## 2024-08-13 VITALS
RESPIRATION RATE: 16 BRPM | OXYGEN SATURATION: 98 % | DIASTOLIC BLOOD PRESSURE: 66 MMHG | TEMPERATURE: 98.1 F | HEART RATE: 100 BPM | SYSTOLIC BLOOD PRESSURE: 100 MMHG

## 2024-08-13 DIAGNOSIS — Z13.39 ENCOUNTER FOR SCREENING EXAMINATION FOR OTHER MENTAL HEALTH AND BEHAVIORAL DISORDERS: Primary | ICD-10-CM

## 2024-08-13 DIAGNOSIS — S90.31XA CONTUSION OF RIGHT FOOT, INITIAL ENCOUNTER: Primary | ICD-10-CM

## 2024-08-13 LAB
ALBUMIN SERPL-MCNC: 4 G/DL (ref 3.5–5.2)
ALP SERPL-CCNC: 76 U/L (ref 35–104)
ALT SERPL-CCNC: 9 U/L (ref 0–32)
AMPHET UR QL SCN: NEGATIVE
ANION GAP SERPL CALCULATED.3IONS-SCNC: 10 MMOL/L (ref 7–16)
APAP SERPL-MCNC: <5 UG/ML (ref 10–30)
AST SERPL-CCNC: 14 U/L (ref 0–31)
BARBITURATES UR QL SCN: NEGATIVE
BASOPHILS # BLD: 0.01 K/UL (ref 0–0.2)
BASOPHILS NFR BLD: 0 % (ref 0–2)
BENZODIAZ UR QL: NEGATIVE
BILIRUB SERPL-MCNC: 0.4 MG/DL (ref 0–1.2)
BUN SERPL-MCNC: 6 MG/DL (ref 6–20)
BUPRENORPHINE UR QL: NEGATIVE
CALCIUM SERPL-MCNC: 8.8 MG/DL (ref 8.6–10.2)
CANNABINOIDS UR QL SCN: POSITIVE
CHLORIDE SERPL-SCNC: 105 MMOL/L (ref 98–107)
CO2 SERPL-SCNC: 28 MMOL/L (ref 22–29)
COCAINE UR QL SCN: NEGATIVE
CREAT SERPL-MCNC: 0.6 MG/DL (ref 0.5–1)
EKG ATRIAL RATE: 65 BPM
EKG P AXIS: 66 DEGREES
EKG P-R INTERVAL: 158 MS
EKG Q-T INTERVAL: 430 MS
EKG QRS DURATION: 78 MS
EKG QTC CALCULATION (BAZETT): 447 MS
EKG R AXIS: 90 DEGREES
EKG T AXIS: 58 DEGREES
EKG VENTRICULAR RATE: 65 BPM
EOSINOPHIL # BLD: 0.04 K/UL (ref 0.05–0.5)
EOSINOPHILS RELATIVE PERCENT: 1 % (ref 0–6)
ERYTHROCYTE [DISTWIDTH] IN BLOOD BY AUTOMATED COUNT: 14 % (ref 11.5–15)
ETHANOLAMINE SERPL-MCNC: <10 MG/DL (ref 0–0.08)
FENTANYL UR QL: NEGATIVE
GFR, ESTIMATED: >90 ML/MIN/1.73M2
GLUCOSE SERPL-MCNC: 161 MG/DL (ref 74–99)
HCT VFR BLD AUTO: 29.3 % (ref 34–48)
HGB BLD-MCNC: 9.7 G/DL (ref 11.5–15.5)
IMM GRANULOCYTES # BLD AUTO: <0.03 K/UL (ref 0–0.58)
IMM GRANULOCYTES NFR BLD: 0 % (ref 0–5)
LYMPHOCYTES NFR BLD: 1.66 K/UL (ref 1.5–4)
LYMPHOCYTES RELATIVE PERCENT: 21 % (ref 20–42)
MCH RBC QN AUTO: 29.9 PG (ref 26–35)
MCHC RBC AUTO-ENTMCNC: 33.1 G/DL (ref 32–34.5)
MCV RBC AUTO: 90.4 FL (ref 80–99.9)
METHADONE UR QL: NEGATIVE
MONOCYTES NFR BLD: 0.58 K/UL (ref 0.1–0.95)
MONOCYTES NFR BLD: 7 % (ref 2–12)
NEUTROPHILS NFR BLD: 71 % (ref 43–80)
NEUTS SEG NFR BLD: 5.67 K/UL (ref 1.8–7.3)
OPIATES UR QL SCN: NEGATIVE
OXYCODONE UR QL SCN: NEGATIVE
PCP UR QL SCN: NEGATIVE
PLATELET # BLD AUTO: 333 K/UL (ref 130–450)
PMV BLD AUTO: 9 FL (ref 7–12)
POTASSIUM SERPL-SCNC: 3.6 MMOL/L (ref 3.5–5)
PROT SERPL-MCNC: 7.2 G/DL (ref 6.4–8.3)
RBC # BLD AUTO: 3.24 M/UL (ref 3.5–5.5)
SALICYLATES SERPL-MCNC: <0.3 MG/DL (ref 0–30)
SODIUM SERPL-SCNC: 143 MMOL/L (ref 132–146)
TEST INFORMATION: ABNORMAL
TOXIC TRICYCLIC SC,BLOOD: NEGATIVE
WBC OTHER # BLD: 8 K/UL (ref 4.5–11.5)

## 2024-08-13 PROCEDURE — 99283 EMERGENCY DEPT VISIT LOW MDM: CPT

## 2024-08-13 PROCEDURE — 93005 ELECTROCARDIOGRAM TRACING: CPT | Performed by: STUDENT IN AN ORGANIZED HEALTH CARE EDUCATION/TRAINING PROGRAM

## 2024-08-13 PROCEDURE — 80053 COMPREHEN METABOLIC PANEL: CPT

## 2024-08-13 PROCEDURE — 73610 X-RAY EXAM OF ANKLE: CPT

## 2024-08-13 PROCEDURE — 80307 DRUG TEST PRSMV CHEM ANLYZR: CPT

## 2024-08-13 PROCEDURE — 73630 X-RAY EXAM OF FOOT: CPT

## 2024-08-13 PROCEDURE — 93010 ELECTROCARDIOGRAM REPORT: CPT | Performed by: INTERNAL MEDICINE

## 2024-08-13 PROCEDURE — 80143 DRUG ASSAY ACETAMINOPHEN: CPT

## 2024-08-13 PROCEDURE — G0480 DRUG TEST DEF 1-7 CLASSES: HCPCS

## 2024-08-13 PROCEDURE — 85025 COMPLETE CBC W/AUTO DIFF WBC: CPT

## 2024-08-13 PROCEDURE — 80179 DRUG ASSAY SALICYLATE: CPT

## 2024-08-13 PROCEDURE — 99284 EMERGENCY DEPT VISIT MOD MDM: CPT

## 2024-08-13 ASSESSMENT — PAIN DESCRIPTION - DESCRIPTORS: DESCRIPTORS: SHOOTING

## 2024-08-13 ASSESSMENT — LIFESTYLE VARIABLES
HOW MANY STANDARD DRINKS CONTAINING ALCOHOL DO YOU HAVE ON A TYPICAL DAY: 1 OR 2
HOW OFTEN DO YOU HAVE A DRINK CONTAINING ALCOHOL: 2-4 TIMES A MONTH

## 2024-08-13 ASSESSMENT — PAIN - FUNCTIONAL ASSESSMENT
PAIN_FUNCTIONAL_ASSESSMENT: NONE - DENIES PAIN
PAIN_FUNCTIONAL_ASSESSMENT: 0-10
PAIN_FUNCTIONAL_ASSESSMENT: 0-10

## 2024-08-13 ASSESSMENT — PAIN SCALES - GENERAL
PAINLEVEL_OUTOF10: 10
PAINLEVEL_OUTOF10: 0
PAINLEVEL_OUTOF10: 4

## 2024-08-13 ASSESSMENT — PAIN DESCRIPTION - LOCATION
LOCATION: FOOT
LOCATION: FOOT

## 2024-08-13 ASSESSMENT — PAIN DESCRIPTION - ORIENTATION
ORIENTATION: RIGHT
ORIENTATION: RIGHT

## 2024-08-13 NOTE — DISCHARGE INSTRUCTIONS
Patient is not suicidal or homicidal and is medically cleared for rescue mission  Follow-up with primary care doctor  If you notice any new worrisome symptoms please return to emergency department for evaluation

## 2024-08-13 NOTE — ED PROVIDER NOTES
MEDICATIONS       Previous Medications    ACETAMINOPHEN EXTRA STRENGTH 500 MG TABS        BLOOD GLUCOSE MONITOR KIT AND SUPPLIES    Dispense sufficient amount for indicated testing frequency plus additional to accommodate PRN testing needs. Dispense all needed supplies to include: monitor, strips, lancing device, lancets, control solutions, alcohol swabs.    IBUPROFEN (ADVIL;MOTRIN) 800 MG TABLET        METFORMIN (GLUCOPHAGE) 1000 MG TABLET    Take 1 tablet by mouth 2 times daily (with meals)    ONDANSETRON (ZOFRAN) 4 MG TABLET    Take 1 tablet by mouth daily as needed for Nausea or Vomiting    POTASSIUM CHLORIDE (KLOR-CON M) 20 MEQ EXTENDED RELEASE TABLET    Take 2 tablets by mouth daily for 3 days    TAMOXIFEN (NOLVADEX) 20 MG TABLET    TAKE 1 TABLET BY MOUTH DAILY       SCREENINGS     [place] Coma Scale  Eye Opening: Spontaneous  Best Verbal Response: Oriented  Best Motor Response: Obeys commands  [place] Coma Scale Score: 15         CIWA Assessment  BP: 100/66  Pulse: 100       PHYSICAL EXAM   Oxygen Saturation Interpretation: Normal on room air analysis.        ED Triage Vitals [08/13/24 0040]   BP Systolic BP Percentile Diastolic BP Percentile Temp Temp Source Pulse Respirations SpO2   100/66 -- -- 98.1 °F (36.7 °C) Oral 100 16 98 %      Height Weight         -- --               Physical Exam  General: Awake alert and oriented.  Well-appearing.  Nontoxic.  HEENT: Normocephalic, atraumatic.  Pupils equal  Neck: Normal range of motion  Cardiac: Regular rate  Respiratory: Respirations even, unlabored.  No respiratory distress  Abdomen: Nondistended  Musculoskelatal: Right foot mild bleeding to the dorsum.  Easily palpated dorsalis pedis posterior tibial pulses.  Sensation is intact.  Extremities well-perfused   neuro: Nonlateralizing  Skin: Flesh tone, warm, dry  Psych: Normal affect    DIAGNOSTIC RESULTS   (All laboratory and radiology results have been personally reviewed by myself)  Labs:  No results found for

## 2024-08-14 NOTE — ED PROVIDER NOTES
[facility] EMERGENCY DEPARTMENT  EMERGENCY DEPARTMENT ENCOUNTER      Pt Name: Krystle Alves  MRN: [id number]  Birthdate 1980  Date of evaluation: 8/13/2024  Provider: Tanmay Santa DO  PCP: Susan Rose MD    CHIEF COMPLAINT       Chief Complaint   Patient presents with    Psychiatric Evaluation     Needs psych evaluation for rescue mission, denies SI/ HI, mentioned some depression because her kids got taken away yesterday        HISTORY OF PRESENT ILLNESS: 1 or more Elements   History From: Patient  Limitations to history : None    Krystle Alves is a 44 y.o. female past medical history of sickle cell trait as well as diabetes.  Patient presents to complaint of behavioral health screening.  Patient is trying to get into the rescue mission and needs a behavioral screening evaluation prior to going there.  Patient is not suicidal homicidal.  She does have some mild depression since her kids were taken away from her yesterday.  She has no significant psychiatric issues in the past.  Patient denies any fevers, chills, chest pain, cough, Jonathan pain, constipation or diarrhea.    Nursing Notes were all reviewed and agreed with or any disagreements were addressed in the HPI.    REVIEW OF SYSTEMS :    Positives and Pertinent negatives as per HPI.     PAST MEDICAL HISTORY/Chronic Conditions Affecting Care    has a past medical history of Abnormal Pap smear, Breast cancer (HCC), Cancer (HCC) (11/2022), Diabetes mellitus (HCC) (01/01/2014), History of therapeutic radiation, Other congenital or acquired abnormality of cervix, antepartum condition or complication (11/06/2014), PONV (postoperative nausea and vomiting), and Sickle cell trait (HCC).     SURGICAL HISTORY     Past Surgical History:   Procedure Laterality Date    BREAST BIOPSY      BREAST BIOPSY Left 02/07/2023    Left breast bracketed 2 mag seeds localized lumpectomy left axillary sentinel lymph node

## 2024-08-22 DIAGNOSIS — D50.9 IRON DEFICIENCY ANEMIA, UNSPECIFIED IRON DEFICIENCY ANEMIA TYPE: Primary | ICD-10-CM

## 2024-09-18 DIAGNOSIS — C50.912 MALIGNANT NEOPLASM OF LEFT BREAST IN FEMALE, ESTROGEN RECEPTOR POSITIVE, UNSPECIFIED SITE OF BREAST (HCC): Primary | ICD-10-CM

## 2024-09-18 DIAGNOSIS — Z17.0 MALIGNANT NEOPLASM OF LEFT BREAST IN FEMALE, ESTROGEN RECEPTOR POSITIVE, UNSPECIFIED SITE OF BREAST (HCC): Primary | ICD-10-CM

## 2024-09-18 RX ORDER — TAMOXIFEN CITRATE 20 MG/1
20 TABLET ORAL DAILY
Qty: 30 TABLET | Refills: 0 | Status: SHIPPED | OUTPATIENT
Start: 2024-09-18 | End: 2024-10-18

## 2024-10-08 ENCOUNTER — TELEPHONE (OUTPATIENT)
Dept: BREAST CENTER | Age: 44
End: 2024-10-08

## 2024-10-08 NOTE — TELEPHONE ENCOUNTER
Patient did not show for her breast imaging and appointment in the breast clinic. Attempted to reach her but I received a message that the person is not accepting phone calls at this time. I attempted her emergency contact but her voicemail was full. No HIPAA form on file.

## 2025-01-10 ENCOUNTER — APPOINTMENT (OUTPATIENT)
Dept: CT IMAGING | Age: 45
End: 2025-01-10
Payer: MEDICAID

## 2025-01-10 ENCOUNTER — HOSPITAL ENCOUNTER (EMERGENCY)
Age: 45
Discharge: HOME | End: 2025-01-10
Attending: EMERGENCY MEDICINE
Payer: MEDICAID

## 2025-01-10 ENCOUNTER — APPOINTMENT (OUTPATIENT)
Dept: GENERAL RADIOLOGY | Age: 45
End: 2025-01-10
Payer: MEDICAID

## 2025-01-10 VITALS
OXYGEN SATURATION: 100 % | SYSTOLIC BLOOD PRESSURE: 110 MMHG | RESPIRATION RATE: 18 BRPM | TEMPERATURE: 99.4 F | DIASTOLIC BLOOD PRESSURE: 63 MMHG | HEART RATE: 94 BPM

## 2025-01-10 DIAGNOSIS — R07.89 CHEST WALL PAIN: Primary | ICD-10-CM

## 2025-01-10 LAB
ALBUMIN SERPL-MCNC: 4 G/DL (ref 3.5–5.2)
ALP SERPL-CCNC: 89 U/L (ref 35–104)
ALT SERPL-CCNC: 10 U/L (ref 0–32)
ANION GAP SERPL CALCULATED.3IONS-SCNC: 12 MMOL/L (ref 7–16)
AST SERPL-CCNC: 17 U/L (ref 0–31)
BASOPHILS # BLD: 0.01 K/UL (ref 0–0.2)
BASOPHILS NFR BLD: 0 % (ref 0–2)
BILIRUB SERPL-MCNC: 0.3 MG/DL (ref 0–1.2)
BUN SERPL-MCNC: 8 MG/DL (ref 6–20)
CALCIUM SERPL-MCNC: 8.7 MG/DL (ref 8.6–10.2)
CHLORIDE SERPL-SCNC: 103 MMOL/L (ref 98–107)
CO2 SERPL-SCNC: 26 MMOL/L (ref 22–29)
CREAT SERPL-MCNC: 0.7 MG/DL (ref 0.5–1)
D-DIMER QUANTITATIVE: 342 NG/ML DDU (ref 0–230)
EKG ATRIAL RATE: 90 BPM
EKG P AXIS: 72 DEGREES
EKG P-R INTERVAL: 154 MS
EKG Q-T INTERVAL: 364 MS
EKG QRS DURATION: 70 MS
EKG QTC CALCULATION (BAZETT): 445 MS
EKG R AXIS: 100 DEGREES
EKG T AXIS: 65 DEGREES
EKG VENTRICULAR RATE: 90 BPM
EOSINOPHIL # BLD: 0.04 K/UL (ref 0.05–0.5)
EOSINOPHILS RELATIVE PERCENT: 1 % (ref 0–6)
ERYTHROCYTE [DISTWIDTH] IN BLOOD BY AUTOMATED COUNT: 13.1 % (ref 11.5–15)
GFR, ESTIMATED: >90 ML/MIN/1.73M2
GLUCOSE SERPL-MCNC: 161 MG/DL (ref 74–99)
HCG, URINE, POC: NEGATIVE
HCT VFR BLD AUTO: 36.6 % (ref 34–48)
HGB BLD-MCNC: 11.7 G/DL (ref 11.5–15.5)
IMM GRANULOCYTES # BLD AUTO: <0.03 K/UL (ref 0–0.58)
IMM GRANULOCYTES NFR BLD: 0 % (ref 0–5)
LYMPHOCYTES NFR BLD: 1.12 K/UL (ref 1.5–4)
LYMPHOCYTES RELATIVE PERCENT: 15 % (ref 20–42)
Lab: NORMAL
MCH RBC QN AUTO: 27.5 PG (ref 26–35)
MCHC RBC AUTO-ENTMCNC: 32 G/DL (ref 32–34.5)
MCV RBC AUTO: 85.9 FL (ref 80–99.9)
MONOCYTES NFR BLD: 0.79 K/UL (ref 0.1–0.95)
MONOCYTES NFR BLD: 10 % (ref 2–12)
NEGATIVE QC PASS/FAIL: NORMAL
NEUTROPHILS NFR BLD: 74 % (ref 43–80)
NEUTS SEG NFR BLD: 5.65 K/UL (ref 1.8–7.3)
PLATELET # BLD AUTO: 268 K/UL (ref 130–450)
PMV BLD AUTO: 9.4 FL (ref 7–12)
POSITIVE QC PASS/FAIL: NORMAL
POTASSIUM SERPL-SCNC: 3.4 MMOL/L (ref 3.5–5)
PROT SERPL-MCNC: 7.7 G/DL (ref 6.4–8.3)
RBC # BLD AUTO: 4.26 M/UL (ref 3.5–5.5)
SODIUM SERPL-SCNC: 141 MMOL/L (ref 132–146)
TROPONIN I SERPL HS-MCNC: <6 NG/L (ref 0–9)
WBC OTHER # BLD: 7.6 K/UL (ref 4.5–11.5)

## 2025-01-10 PROCEDURE — 71045 X-RAY EXAM CHEST 1 VIEW: CPT

## 2025-01-10 PROCEDURE — 6360000002 HC RX W HCPCS: Performed by: EMERGENCY MEDICINE

## 2025-01-10 PROCEDURE — 96374 THER/PROPH/DIAG INJ IV PUSH: CPT

## 2025-01-10 PROCEDURE — 99285 EMERGENCY DEPT VISIT HI MDM: CPT

## 2025-01-10 PROCEDURE — 2500000003 HC RX 250 WO HCPCS: Performed by: RADIOLOGY

## 2025-01-10 PROCEDURE — 93005 ELECTROCARDIOGRAM TRACING: CPT | Performed by: EMERGENCY MEDICINE

## 2025-01-10 PROCEDURE — 85025 COMPLETE CBC W/AUTO DIFF WBC: CPT

## 2025-01-10 PROCEDURE — 6360000004 HC RX CONTRAST MEDICATION: Performed by: RADIOLOGY

## 2025-01-10 PROCEDURE — 80053 COMPREHEN METABOLIC PANEL: CPT

## 2025-01-10 PROCEDURE — 85379 FIBRIN DEGRADATION QUANT: CPT

## 2025-01-10 PROCEDURE — 93010 ELECTROCARDIOGRAM REPORT: CPT | Performed by: INTERNAL MEDICINE

## 2025-01-10 PROCEDURE — 71275 CT ANGIOGRAPHY CHEST: CPT

## 2025-01-10 PROCEDURE — 84484 ASSAY OF TROPONIN QUANT: CPT

## 2025-01-10 RX ORDER — IOPAMIDOL 755 MG/ML
75 INJECTION, SOLUTION INTRAVASCULAR
Status: COMPLETED | OUTPATIENT
Start: 2025-01-10 | End: 2025-01-10

## 2025-01-10 RX ORDER — KETOROLAC TROMETHAMINE 30 MG/ML
15 INJECTION, SOLUTION INTRAMUSCULAR; INTRAVENOUS ONCE
Status: COMPLETED | OUTPATIENT
Start: 2025-01-10 | End: 2025-01-10

## 2025-01-10 RX ORDER — SODIUM CHLORIDE 0.9 % (FLUSH) 0.9 %
10 SYRINGE (ML) INJECTION PRN
Status: DISCONTINUED | OUTPATIENT
Start: 2025-01-10 | End: 2025-01-10 | Stop reason: HOSPADM

## 2025-01-10 RX ADMIN — IOPAMIDOL 75 ML: 755 INJECTION, SOLUTION INTRAVENOUS at 03:29

## 2025-01-10 RX ADMIN — KETOROLAC TROMETHAMINE 15 MG: 30 INJECTION, SOLUTION INTRAMUSCULAR at 07:47

## 2025-01-10 RX ADMIN — Medication 10 ML: at 03:32

## 2025-01-10 ASSESSMENT — PAIN SCALES - GENERAL: PAINLEVEL_OUTOF10: 8

## 2025-01-10 NOTE — ED PROVIDER NOTES
HPI:  1/10/25, Time: 12:21 AM GARFIELD Alves is a 44 y.o. female history of breast cancer diabetes history of sickle cell trait history of abnormality of cervix presenting to the ED for chest pain, beginning 5 PM ago.  The complaint has been persistent, moderate in severity, and worsened by nothing.  Patient reporting left lateral rib pain chest pain started on 5 PM it has been constant does hurt with laying on the left side.  Patient reporting no shortness breath reports no productive cough she reports no pleuritic pain she reports no leg pain or swelling.  Patient does report history of breast cancer.  Patient reporting no fall or injury.  She reports no headache she reports no lightheadedness or syncopal event.  Patient denies any rash    ROS:   Pertinent positives and negatives are stated within HPI, all other systems reviewed and are negative.  --------------------------------------------- PAST HISTORY ---------------------------------------------  Past Medical History:  has a past medical history of Abnormal Pap smear, Breast cancer (HCC), Cancer (HCC), Diabetes mellitus (HCC), History of therapeutic radiation, Other congenital or acquired abnormality of cervix, antepartum condition or complication, PONV (postoperative nausea and vomiting), and Sickle cell trait (HCC).    Past Surgical History:  has a past surgical history that includes laparoscopy;  section; Breast biopsy; [facility] STEREO BREAST BX W LOC DEVICE 1ST LESION LEFT (Left, 2022); Breast biopsy (Left, 2023); Dilation and curettage of uterus (); and mastopexy (Right, 2024).    Social History:  reports that she has never smoked. She has never used smokeless tobacco. She reports that she does not currently use alcohol after a past usage of about 1.0 standard drink of alcohol per week. She reports that she does not currently use drugs.    Family History: family history includes Alcohol Abuse in her mother; Diabetes

## 2025-02-19 ENCOUNTER — TELEPHONE (OUTPATIENT)
Dept: BREAST CENTER | Age: 45
End: 2025-02-19

## 2025-02-19 NOTE — TELEPHONE ENCOUNTER
RN received a message from [facility] schedulers that patient called in and needs clearance to have dental work done.       RN contacted patient back and spoke with patient about her clearance. Patient states she needs to have clearance because she had radiation. RN advised patient the clearance should come from the radiation oncologist then if in regards to her past radiation. Patient verbalized understanding. RN checked to see if patient needed radiation oncology number patient states she is out right now and will Google it. Patient states she lives in [place] now.      Electronically signed by Nadine Kamara RN on 2/19/25 at 1:49 PM EST

## 2025-06-17 ENCOUNTER — TELEPHONE (OUTPATIENT)
Dept: SURGERY | Age: 45
End: 2025-06-17

## 2025-06-17 NOTE — TELEPHONE ENCOUNTER
Office attempted to call patient to reschedule her appointment that she missed on 6/16/25. The number was no longer in service. A letter was mailed to patient.

## (undated) DEVICE — 3M™ STERI-STRIP™ REINFORCED ADHESIVE SKIN CLOSURES, R1548, 1 IN X 5 IN (25 MM X 125 MM), 4 STRIPS/ENVELOPE: Brand: 3M™ STERI-STRIP™

## (undated) DEVICE — Device

## (undated) DEVICE — SYSTEM IMPL DEL FOR BRST IMPL FUN

## (undated) DEVICE — GARMENT,MEDLINE,DVT,INT,CALF,MED, GEN2: Brand: MEDLINE

## (undated) DEVICE — TUBING, SUCTION, 3/16" X 12', STRAIGHT: Brand: MEDLINE

## (undated) DEVICE — STERILE POLYISOPRENE POWDER-FREE SURGICAL GLOVES: Brand: PROTEXIS

## (undated) DEVICE — ADHESIVE SKIN CLSR 0.7ML TOP DERMBND ADV

## (undated) DEVICE — BANDAGE,GAUZE,4.5"X4.1YD,STERILE,LF: Brand: MEDLINE

## (undated) DEVICE — STANDARD HYPODERMIC NEEDLE,ALUMINUM HUB: Brand: MONOJECT

## (undated) DEVICE — GOWN,SIRUS,FABRNF,XL,20/CS: Brand: MEDLINE

## (undated) DEVICE — MODERATE CLASSIC PROFILE, MC 130CC GEL SIZER: Brand: MENTOR MEMORYGEL RESTERILIZABLE GEL SIZER

## (undated) DEVICE — GOWN,SIRUS,FABRNF,L,20/CS: Brand: MEDLINE

## (undated) DEVICE — TOWEL,OR,DSP,ST,BLUE,STD,6/PK,12PK/CS: Brand: MEDLINE

## (undated) DEVICE — ELECTRODE PT RET AD L9FT HI MOIST COND ADH HYDRGEL CORDED

## (undated) DEVICE — NEEDLE HYPO 27GA L15IN REG BVL W O SFTY FOR SYR DISPOSABLE

## (undated) DEVICE — SYRINGE MED 10ML TRNSLUC BRL PLUNG BLK MRK POLYPR CTRL

## (undated) DEVICE — SOLUTION SCRB 1% POVIDONE IOD BRN ANTIMIC SKIN CLN

## (undated) DEVICE — PLASMABLADE PS210-030S 3.0S LOCK: Brand: PLASMABLADE™

## (undated) DEVICE — APPLICATOR MEDICATED 26 CC SOLUTION HI LT ORNG CHLORAPREP

## (undated) DEVICE — WIPES SKIN CLOTH READYPREP 9 X 10.5 IN 2% CHLORHEX GLUCONATE CHG PREOP

## (undated) DEVICE — UNIVERSAL DRAPE: Brand: MEDLINE INDUSTRIES, INC.

## (undated) DEVICE — DRAPE THER FLUID WARMING 66X44 IN FLAT SLUSH DBL DISC ORS

## (undated) DEVICE — BLADE,STAINLESS-STEEL,10,STRL,DISPOSABLE: Brand: MEDLINE

## (undated) DEVICE — DECANTER BAG 9": Brand: MEDLINE INDUSTRIES, INC.

## (undated) DEVICE — LIQUIBAND RAPID ADHESIVE 36/CS 0.8ML: Brand: MEDLINE

## (undated) DEVICE — SYRINGE IRRIG 60ML SFT PLIABLE BLB EZ TO GRP 1 HND USE W/

## (undated) DEVICE — DRAPE,REIN 53X77,STERILE: Brand: MEDLINE

## (undated) DEVICE — PROBE GAM TRUNODE DISP EACH

## (undated) DEVICE — GLOVE ORANGE PI 8   MSG9080

## (undated) DEVICE — APPLIER LIG CLP M L11IN TI STR RNG HNDL FOR 20 CLP DISP